# Patient Record
Sex: FEMALE | Race: BLACK OR AFRICAN AMERICAN | Employment: OTHER | URBAN - METROPOLITAN AREA
[De-identification: names, ages, dates, MRNs, and addresses within clinical notes are randomized per-mention and may not be internally consistent; named-entity substitution may affect disease eponyms.]

---

## 2017-09-15 ENCOUNTER — HOSPITAL ENCOUNTER (OUTPATIENT)
Dept: MAMMOGRAPHY | Age: 67
Discharge: HOME | End: 2017-09-15
Attending: OBSTETRICS & GYNECOLOGY
Payer: MEDICARE

## 2017-09-15 DIAGNOSIS — Z12.31 VISIT FOR SCREENING MAMMOGRAM: ICD-10-CM

## 2017-09-15 PROCEDURE — 77067 SCR MAMMO BI INCL CAD: CPT

## 2018-04-25 ENCOUNTER — HOSPITAL ENCOUNTER (OUTPATIENT)
Dept: MAMMOGRAPHY | Age: 68
Discharge: HOME | End: 2018-04-25
Attending: FAMILY MEDICINE
Payer: MEDICARE

## 2018-04-25 DIAGNOSIS — Z78.0 POST-MENOPAUSAL: ICD-10-CM

## 2018-04-25 PROCEDURE — 77080 DXA BONE DENSITY AXIAL: CPT

## 2018-08-17 ENCOUNTER — HOSPITAL ENCOUNTER (OUTPATIENT)
Dept: LAB | Age: 68
Discharge: HOME | End: 2018-08-17

## 2018-08-17 PROCEDURE — 88305 TISSUE EXAM BY PATHOLOGIST: CPT

## 2018-09-17 ENCOUNTER — HOSPITAL ENCOUNTER (OUTPATIENT)
Dept: MAMMOGRAPHY | Age: 68
Discharge: HOME | End: 2018-09-17
Payer: MEDICARE

## 2018-09-17 DIAGNOSIS — Z12.39 SCREENING BREAST EXAMINATION: ICD-10-CM

## 2018-09-17 PROCEDURE — 77067 SCR MAMMO BI INCL CAD: CPT

## 2019-10-04 ENCOUNTER — HOSPITAL ENCOUNTER (OUTPATIENT)
Dept: MAMMOGRAPHY | Age: 69
Discharge: HOME | End: 2019-10-04
Attending: OBSTETRICS & GYNECOLOGY
Payer: MEDICARE

## 2019-10-04 DIAGNOSIS — Z12.31 VISIT FOR SCREENING MAMMOGRAM: ICD-10-CM

## 2019-10-04 PROCEDURE — 77063 BREAST TOMOSYNTHESIS BI: CPT

## 2020-10-06 ENCOUNTER — HOSPITAL ENCOUNTER (OUTPATIENT)
Dept: MAMMOGRAPHY | Age: 70
Discharge: HOME | End: 2020-10-06
Attending: FAMILY MEDICINE
Payer: MEDICARE

## 2020-10-06 DIAGNOSIS — Z12.31 SCREENING MAMMOGRAM, ENCOUNTER FOR: ICD-10-CM

## 2020-10-06 PROCEDURE — 77067 SCR MAMMO BI INCL CAD: CPT

## 2020-10-08 ENCOUNTER — TRANSCRIBE ORDER (OUTPATIENT)
Dept: SCHEDULING | Age: 70
End: 2020-10-08

## 2020-10-08 DIAGNOSIS — M81.0 OSTEOPOROSIS, UNSPECIFIED OSTEOPOROSIS TYPE, UNSPECIFIED PATHOLOGICAL FRACTURE PRESENCE: ICD-10-CM

## 2020-10-08 DIAGNOSIS — E55.9 VITAMIN D DEFICIENCY: Primary | ICD-10-CM

## 2020-10-12 ENCOUNTER — TRANSCRIBE ORDER (OUTPATIENT)
Dept: SCHEDULING | Age: 70
End: 2020-10-12

## 2020-10-12 DIAGNOSIS — N64.4 BREAST PAIN: Primary | ICD-10-CM

## 2020-10-14 ENCOUNTER — HOSPITAL ENCOUNTER (OUTPATIENT)
Dept: MAMMOGRAPHY | Age: 70
Discharge: HOME | End: 2020-10-14
Attending: FAMILY MEDICINE
Payer: MEDICARE

## 2020-10-14 DIAGNOSIS — E55.9 VITAMIN D DEFICIENCY: ICD-10-CM

## 2020-10-14 DIAGNOSIS — M81.0 OSTEOPOROSIS, UNSPECIFIED OSTEOPOROSIS TYPE, UNSPECIFIED PATHOLOGICAL FRACTURE PRESENCE: ICD-10-CM

## 2020-10-14 PROCEDURE — 77080 DXA BONE DENSITY AXIAL: CPT

## 2020-10-21 ENCOUNTER — HOSPITAL ENCOUNTER (OUTPATIENT)
Dept: MAMMOGRAPHY | Age: 70
Discharge: HOME | End: 2020-10-21
Attending: NURSE PRACTITIONER
Payer: MEDICARE

## 2020-10-21 DIAGNOSIS — N64.4 BREAST PAIN: ICD-10-CM

## 2020-10-21 PROCEDURE — 77065 DX MAMMO INCL CAD UNI: CPT

## 2020-10-21 PROCEDURE — 76642 ULTRASOUND BREAST LIMITED: CPT

## 2021-08-11 ENCOUNTER — TRANSCRIBE ORDER (OUTPATIENT)
Dept: SCHEDULING | Age: 71
End: 2021-08-11

## 2021-08-11 DIAGNOSIS — Z12.31 VISIT FOR SCREENING MAMMOGRAM: Primary | ICD-10-CM

## 2021-10-08 ENCOUNTER — HOSPITAL ENCOUNTER (OUTPATIENT)
Dept: MAMMOGRAPHY | Age: 71
Discharge: HOME | End: 2021-10-08
Attending: FAMILY MEDICINE
Payer: MEDICARE

## 2021-10-08 DIAGNOSIS — Z12.31 VISIT FOR SCREENING MAMMOGRAM: ICD-10-CM

## 2021-10-08 PROCEDURE — 77063 BREAST TOMOSYNTHESIS BI: CPT

## 2022-04-05 ENCOUNTER — HOSPITAL ENCOUNTER (OUTPATIENT)
Dept: PHYSICAL THERAPY | Age: 72
Discharge: HOME | End: 2022-04-05
Payer: MEDICARE

## 2022-04-05 PROCEDURE — 97140 MANUAL THERAPY 1/> REGIONS: CPT

## 2022-04-05 PROCEDURE — 97161 PT EVAL LOW COMPLEX 20 MIN: CPT

## 2022-04-05 NOTE — THERAPY EVALUATION
[id number]}  Luz Johns  : 1950  [place]  Secondary: 2830 [facility] at [facility]  [address] [place] [address]  [phone]   [phone]          OUTPATIENT PHYSICAL THERAPY:Initial Assessment 2022   ICD-10: Treatment Diagnosis: Neck pain (M54.2)  Precautions/Allergies:   Asa-acetaminophen-caff-buffers, Darvon [propoxyphene], Myrbetriq [mirabegron], and Sulfa (sulfonamide antibiotics)   TREATMENT PLAN:  Effective Dates: 2022 TO 2022 (60 days). Frequency/Duration: 2 times a week for 60 Day(s) MEDICAL/REFERRING DIAGNOSIS:  Cervicalgia [M54.2]   DATE OF ONSET: 2022  REFERRING PHYSICIAN: Raquel Cruz MD MD Orders: evaluate and treat  Return MD Appointment: May 2022     INITIAL ASSESSMENT:  Ms. Andreea Candelaria presents in therapy today with B/L paracervical muscle pain, which began in 2022. Signs and symptoms indicate a muscle strain, with decreased motion at the cervical spine. She will benefit from skilled PT in order to reach optimum functional mobility and pain at the cervical region. PROBLEM LIST (Impacting functional limitations):  1. Decreased Strength  2. Decreased ADL/Functional Activities  3. Increased Pain  4. Decreased Activity Tolerance  5. Decreased Flexibility/Joint Mobility  6. Decreased Knowledge of Precautions  7. Decreased [place] with Home Exercise Program INTERVENTIONS PLANNED: (Treatment may consist of any combination of the following)  1. Cold  2. Heat  3. Home Exercise Program (HEP)  4. Manual Therapy  5. Neuromuscular Re-education/Strengthening  6. Range of Motion (ROM)  7. Therapeutic Activites  8. Therapeutic Exercise/Strengthening     GOALS: (Goals have been discussed and agreed upon with patient.)    SHORT-TERM FUNCTIONAL GOALS: Time Frame: 3 weeks  1. Patient will be compliant with HEP focused on cervical/postural stabilization and strength/ROM.    2. Patient will rate neck pain no greater than 5/10 at worst for improved tolerance to daily and work activities. DISCHARGE GOALS: Time Frame: 6 weeks  1. Patient will be independent with comprehensive HEP focused on cervical stabilization and postural strengthening. 2.  Patient will rate neck pain no greater than 3/10 and which does not significantly interfere with daily or work duties. 3.  Patient will demonstrate cervical AROM to be [facility] [address] [facility] for improved safety with functional mobility. OUTCOME MEASURE:   Tool Used: Neck Disability Index (NDI)  Score:  Initial: 14/50  Most Recent: X/50 (Date: -- )   Interpretation of Score: The Neck Disability Index is a revised form of the Oswestry Low Back Pain Index and is designed to measure the activities of daily living in person's with neck pain. Each section is scored on a 0-5 scale, 5 representing the greatest disability. The scores of each section are added together for a total score of 50. MEDICAL NECESSITY:   · Patient is expected to demonstrate progress in strength, range of motion and functional technique to improve safety during functional mobility. REASON FOR SERVICES/OTHER COMMENTS:  · Patient continues to require skilled intervention due to not reaching long term goals. Total Duration:  PT Patient Time In/Time Out  Time In: 1300  Time Out: 1355    Rehabilitation Potential For Stated Goals: Good  Regarding Mayi Matthews's therapy, I certify that the treatment plan above will be carried out by a therapist or under their direction. Thank you for this referral,  Radha Prabhakar, PT, DPT     Referring Physician Signature: Daniel Rodas MD _______________________________ Date _____________      PAIN/SUBJECTIVE:   Initial: Pain Intensity 1: 8  Pain Location 1: Neck  Post Session:  5/10   HISTORY:   History of Injury/Illness (Reason for Referral):  Patient reports insidious onset of neck pain, beginning around 2-3 weeks ago.  She reports stiffness at pain at the neck, particularly along paraspinals and upper trap. She rates pain as 8/10 at worst, without dizziness, vision issues, sensory deficits. Past Medical History/Comorbidities:   Ms. Jennette Goltz  has a past medical history of Anemia, IBS (irritable bowel syndrome), Mixed conductive and sensorineural hearing loss, Unspecified hypothyroidism (2/26/2013), and Unspecified vitamin D deficiency. Ms. Jennette Goltz  has a past surgical history that includes hx breast lumpectomy; hx total abdominal hysterectomy (1986); hx hemorrhoidectomy (1968); pr appendectomy; hx other surgical; hx other surgical; hx other surgical; hx cataract removal (Bilateral, 2018); hx breast biopsy (Right, over 30 yrs ago); and hx breast biopsy (Left, over 30 yrs ago). Social History/Living Environment:     Independent   Prior Level of Function/Work/Activity:  Retired nurse   Personal Factors:          Sex:  female        Age:  66 y.o. Ambulatory/Rehab Services H2 Model Falls Risk Assessment   Risk Factors:       No Risk Factors Identified Ability to Rise from Chair:       (0)  Ability to rise in a single movement   Falls Prevention Plan:       No modifications necessary   Total: (5 or greater = High Risk): 0   ©2010 [facility] [employer]. All Rights Reserved. [facility] Patent #[id number]. Federal Law prohibits the replication, distribution or use without written permission from [facility] [employer]   Current Medications:       Current Outpatient Medications:     ferrous gluconate (FERGON) 240 mg (27 mg iron) tablet, Take 240 mg by mouth three (3) times daily (with meals). , Disp: , Rfl:     levothyroxine (SYNTHROID) 50 mcg tablet, TAKE ONE TABLET BY MOUTH EVERY DAY, Disp: 30 Tab, Rfl: 11    cod liver oil cap, Take  by mouth., Disp: , Rfl:     biotin 10 mg tab, Take  by mouth., Disp: , Rfl:     docusate sodium (COLACE) 100 mg capsule, Take 100 mg by mouth., Disp: , Rfl:     CALCIUM POLYCARBOPHIL (FIBERCON PO), Take by mouth., Disp: , Rfl:     DOCUSATE CALCIUM (STOOL SOFTENER PO), Take  by mouth., Disp: , Rfl:     calcium citrate-vitamin D3 (CITRACAL WITH VITAMIN D MAXIMUM) tablet, Take  by mouth two (2) times a day., Disp: , Rfl:     cholecalciferol, vitamin D3, (VITAMIN D3) 2,000 unit Tab, Take  by mouth., Disp: , Rfl:    Date Last Reviewed:  4/6/2022     Number of Personal Factors/Comorbidities that affect the Plan of Care: 0: LOW COMPLEXITY   EXAMINATION:   Observation/Orthostatic Postural Assessment:          Patient appears in healthy condition, very pleasant demeanor; slight forward head with bilaterally rounded shoulders   Palpation:          Tenderness and palpable tightness along cervical paraspinals, upper trap, and at B/L mastoid process  ROM:          Cervical AROM: B/L side-bend 50%, rotation 50%, flexion 75%, extension 25%   Body Structures Involved:  1. Bones  2. Joints  3. Muscles Body Functions Affected:  1. Sensory/Pain  2. Movement Related Activities and Participation Affected:  1. General Tasks and Demands  2. Mobility  3. Interpersonal Interactions and Relationships  4.  Community, Social and [place]   Number of elements (examined above) that affect the Plan of Care: 1-2: LOW COMPLEXITY   CLINICAL PRESENTATION:   Presentation: Stable and uncomplicated: LOW COMPLEXITY   CLINICAL DECISION MAKING:   Use of outcome tool(s) and clinical judgement create a POC that gives a: Clear prediction of patient's progress: LOW COMPLEXITY

## 2022-04-06 NOTE — PROGRESS NOTES
Lilibeth Horner  : 1950  [place]  Secondary: 2830 [facility]  [address] [place] [address]  [phone]   [phone]        OUTPATIENT PHYSICAL THERAPY: Daily Treatment Note 2022  Visit Count:  1    ICD-10: Treatment Diagnosis: Neck pain (M54.2)  Precautions/Allergies:   Asa-acetaminophen-caff-buffers, Darvon [propoxyphene], Myrbetriq [mirabegron], and Sulfa (sulfonamide antibiotics)   TREATMENT PLAN:  Effective Dates: 2022 TO 2022 (60 days). Frequency/Duration: 2 times a week for 60 Day(s) MEDICAL/REFERRING DIAGNOSIS:  Cervicalgia [M54.2]   DATE OF ONSET: 2022  REFERRING PHYSICIAN: Say Ariza MD MD Orders: evaluate and treat  Return MD Appointment: May 2022       Pre-treatment Symptoms/Complaints:  Patient reports she has stiffness and pain at the neck, along the side muscles of her neck. Pain: Initial: Pain Intensity 1: 8  Pain Location 1: Neck  Post Session:  5/10   Medications Last Reviewed:  2022  Updated Objective Findings:  See evaluation note from today  TREATMENT:     MANUAL THERAPY: (15 minutes): Joint mobilization and Soft tissue mobilization was utilized and necessary because of the patient's restricted joint motion and restricted motion of soft tissue. Patient received STM to B/L cervical paraspinals and upper trap, patient seated       Date:   Date:   Date:     Activity/Exercise Parameters Parameters Parameters                                               Treatment/Session Summary:    · Response to Treatment:  Patient reported decreased pain following manual therapy today.  .  · Communication/Consultation:  provided HEP focused on gentle neck AAROM  · Equipment provided today:  None today  · Recommendations/Intent for next treatment session: Next visit will focus on gentle progression of cervical motion, postural strengthening, manual work.    Total Treatment Billable Duration:  45 minutes - 30 minutes initial evaluation, 15 minutes manual therapy   PT Patient Time In/Time Out  Time In: 1300  Time Out: 280 Rafael Hobbs PT, DPT    Future Appointments   Date Time Provider Jeannette Russ   4/8/2022 10:00 AM Huyen Perez PTA [facility] [place]   4/12/2022  1:00 PM Huyen Perez PTA [facility] [place]   4/15/2022  1:00 PM Huyen Perez PTA [facility] [place]   4/19/2022  1:00 PM Rozina Terry PT, DPT [facility] [place]   4/21/2022  2:00 PM Rozina Terry PT, DPT [facility] [place]   7/20/2022 10:15 AM Juan Antonio Mcadams MD [address] [place]

## 2022-04-08 ENCOUNTER — HOSPITAL ENCOUNTER (OUTPATIENT)
Dept: PHYSICAL THERAPY | Age: 72
Discharge: HOME | End: 2022-04-08
Payer: MEDICARE

## 2022-04-08 PROCEDURE — 97140 MANUAL THERAPY 1/> REGIONS: CPT

## 2022-04-08 PROCEDURE — 97110 THERAPEUTIC EXERCISES: CPT

## 2022-04-08 NOTE — PROGRESS NOTES
Ernesto Lyons  : 1950  [place]  Secondary: 2830 [facility] at [address]  [phone]   [phone]        OUTPATIENT PHYSICAL THERAPY: Daily Treatment Note 2022  Visit Count:  2    ICD-10: Treatment Diagnosis: Neck pain (M54.2)  Precautions/Allergies:   Asa-acetaminophen-caff-buffers, Darvon [propoxyphene], Myrbetriq [mirabegron], and Sulfa (sulfonamide antibiotics)   TREATMENT PLAN:  Effective Dates: 2022 TO 2022 (60 days). Frequency/Duration: 2 times a week for 60 Day(s) MEDICAL/REFERRING DIAGNOSIS:  Cervicalgia [M54.2]   DATE OF ONSET: 2022  REFERRING PHYSICIAN: Radha Perdomo MD MD Orders: evaluate and treat  Return MD Appointment: May 2022       Pre-treatment Symptoms/Complaints:  Patient reports she has been doing exercise at home, and it's been feeling a bit better. Pain: Initial: Pain Intensity 1: 7  Pain Location 1: Neck  Post Session:  5/10   Medications Last Reviewed:  2022  Updated Objective Findings:  None today  TREATMENT:       THERAPEUTIC EXERCISE: (45 minutes):  Exercises per grid below to improve mobility and strength. Required moderate visual, verbal and manual cues to promote proper body alignment, promote proper body posture, promote proper body mechanics and promote proper body breathing techniques. Progressed resistance, range, repetitions and complexity of movement as indicated.      Date:  22 Date:   Date:     Activity/Exercise Parameters Parameters Parameters   UBE Level 1, 5 minutes     Cervical AROM (6 planes) x20 each, gentle     Scapular retractions Hold 3 x 20     Posterior shoulder rolls x10 hold 3      Seated cervical retraction  x20 hold 3     Upper trap stretch with hand anchored Hold 30 x3 B     Levator scap stretch with hand anchored Hold 30 x 3 B       MANUAL THERAPY: (15 minutes): Joint mobilization and Soft tissue mobilization was utilized and necessary because of the patient's restricted joint motion and restricted motion of soft tissue. Patient received STM to B/L cervical paraspinals and upper trap, patient seated, with biofreeze      Treatment/Session Summary:    · Response to Treatment:  Patient did well with addition of postural exercises and new stretches. She reported feeling less tension and pain after her session today, and noted improved postural awareness after her session. · Communication/Consultation:  Updated written HEP  · Equipment provided today:  None today  · Recommendations/Intent for next treatment session: Next visit will focus on gentle progression of cervical motion, postural strengthening, manual work.     Total Treatment Billable Duration:  60 minutes - 45 minutes therapeutic exercise, 15 minutes manual therapy   PT Patient Time In/Time Out  Time In: 0907  Time Out: 15[address] [facility]    Future Appointments   Date Time Provider Jeannette Russ   4/12/2022  1:00 PM Eunice Wilhelm PTA [facility]   4/15/2022  1:00 PM Eunice Wilhelm PTA [facility]   4/19/2022  1:00 PM Jerica Calhoun, PT, DPT [facility]   4/21/2022  2:00 PM Jerica Calhoun, PT, DPT [facility]   7/20/2022 10:15 AM Jeanine Hinds MD [address] [facility] [address] [place]

## 2022-04-12 ENCOUNTER — HOSPITAL ENCOUNTER (OUTPATIENT)
Dept: PHYSICAL THERAPY | Age: 72
Discharge: HOME | End: 2022-04-12
Payer: MEDICARE

## 2022-04-12 PROCEDURE — 97110 THERAPEUTIC EXERCISES: CPT

## 2022-04-12 PROCEDURE — 97140 MANUAL THERAPY 1/> REGIONS: CPT

## 2022-04-12 NOTE — PROGRESS NOTES
Celina Peter  : 1950  [place]  Secondary: 2830 [facility]  [address] [place] [address]  [phone]   [phone]        OUTPATIENT PHYSICAL THERAPY: Daily Treatment Note 2022  Visit Count:  3    ICD-10: Treatment Diagnosis: Neck pain (M54.2)  Precautions/Allergies:   Asa-acetaminophen-caff-buffers, Darvon [propoxyphene], Myrbetriq [mirabegron], and Sulfa (sulfonamide antibiotics)   TREATMENT PLAN:  Effective Dates: 2022 TO 2022 (60 days). Frequency/Duration: 2 times a week for 60 Day(s) MEDICAL/REFERRING DIAGNOSIS:  Cervicalgia [M54.2]   DATE OF ONSET: 2022  REFERRING PHYSICIAN: Blaine Enamorado MD MD Orders: evaluate and treat  Return MD Appointment: May 2022       Pre-treatment Symptoms/Complaints:  Patient reports she did a lot of house cleaning on Saturday and then felt significant pain on , she had to take Ibuprofen and use ice to calm it down. She felt good after leaving her last therapy session thought and feels good about her HEP. Pain: Initial:    Post Session:  5/10   Medications Last Reviewed:  2022  Updated Objective Findings:  None today  TREATMENT:       THERAPEUTIC EXERCISE: (25 minutes):  Exercises per grid below to improve mobility and strength. Required moderate visual, verbal and manual cues to promote proper body alignment, promote proper body posture, promote proper body mechanics and promote proper body breathing techniques. Progressed resistance, range, repetitions and complexity of movement as indicated.      Date:  22 Date:  22 Date:     Activity/Exercise Parameters Parameters Parameters   UBE Level 1, 5 minutes Level 1   4 minutes      Cervical AROM (6 planes) x20 each, gentle     Scapular retractions Hold 3 x 20     Posterior shoulder rolls x10 hold 3      Seated cervical retraction  x20 hold 3     Upper trap stretch with hand anchored Hold 30 x3 B     Levator scap stretch with hand anchored Hold 30 x 3 B     hooklying cervical retraction into towel roll  Hold 3 x 10    Standing rows  Red x10    Corner chest stretch  Hold 30 x 3    Doorway chest stretch  Hold 30 x3      MANUAL THERAPY: (35 minutes): Joint mobilization and Soft tissue mobilization was utilized and necessary because of the patient's restricted joint motion and restricted motion of soft tissue. Patient received STM to B/L cervical paraspinals and upper trap, patient seated and hooklying, with biofreeze        Treatment/Session Summary:    · Response to Treatment:  Patient did well with addition of hooklying cervical STM and new postural exercises and chest wall stretches. She reported feeling less tension and pain after her session today, and noted improved postural awareness after her session. · Communication/Consultation:  Updated written HEP and issued red theraband  · Equipment provided today:  None today  · Recommendations/Intent for next treatment session: Next visit will focus on gentle progression of cervical motion, postural strengthening, manual work.     Total Treatment Billable Duration:  60 minutes - 25 minutes therapeutic exercise, 35 minutes manual therapy   PT Patient Time In/Time Out  Time In: 1255  Time Out: [address] [facility] [place]    Future Appointments   Date Time Provider Jeannette Russ   4/15/2022  1:00 PM Huyen Perez PTA [facility]   4/19/2022  1:00 PM Rozina Terry PT, DPT [facility]   4/21/2022  2:00 PM Rozina Terry PT, DPT [facility]   7/20/2022 10:15 AM Juan Antonio Mcadams MD [address] [facility] [address] [place]

## 2022-04-14 ENCOUNTER — HOSPITAL ENCOUNTER (OUTPATIENT)
Dept: PHYSICAL THERAPY | Age: 72
Discharge: HOME | End: 2022-04-14
Payer: MEDICARE

## 2022-04-14 PROCEDURE — 97140 MANUAL THERAPY 1/> REGIONS: CPT

## 2022-04-14 PROCEDURE — 97110 THERAPEUTIC EXERCISES: CPT

## 2022-04-14 NOTE — PROGRESS NOTES
Geovany Gallego  : 1950  [place]  Secondary: 2830 [facility] at [address]  [phone]   [phone]        OUTPATIENT PHYSICAL THERAPY: Daily Treatment Note 2022  Visit Count:  4    ICD-10: Treatment Diagnosis: Neck pain (M54.2)  Precautions/Allergies:   Asa-acetaminophen-caff-buffers, Darvon [propoxyphene], Myrbetriq [mirabegron], and Sulfa (sulfonamide antibiotics)   TREATMENT PLAN:  Effective Dates: 2022 TO 2022 (60 days). Frequency/Duration: 2 times a week for 60 Day(s) MEDICAL/REFERRING DIAGNOSIS:  Cervicalgia [M54.2]   DATE OF ONSET: 2022  REFERRING PHYSICIAN: Jeane Lange MD MD Orders: evaluate and treat  Return MD Appointment: May 2022       Pre-treatment Symptoms/Complaints:  Patient reports she feels like she is steadily improving with her pain and motion. Still feels her neck the greatest when she bends to the side. Pain: Initial: Pain Intensity 1: 4  Pain Location 1: Neck  Post Session:  -3/10   Medications Last Reviewed:  2022  Updated Objective Findings:  None today  TREATMENT:       THERAPEUTIC EXERCISE: (15 minutes):  Exercises per grid below to improve mobility and strength. Required moderate visual, verbal and manual cues to promote proper body alignment, promote proper body posture, promote proper body mechanics and promote proper body breathing techniques. Progressed resistance, range, repetitions and complexity of movement as indicated.      Date:  22 Date:  22 Date:     Activity/Exercise Parameters Parameters Parameters   UBE Level 1, 5 minutes Level 1   4 minutes      Cervical AROM (6 planes) x20 each, gentle     Scapular retractions Hold 3 x 20     Posterior shoulder rolls x10 hold 3      Seated cervical retraction  x20 hold 3     Upper trap stretch with hand anchored Hold 30 x3 B     Levator scap stretch with hand anchored Hold 30 x 3 B     hooklying cervical retraction into towel roll  Hold 3 x 10    Standing rows  Red x10    Corner chest stretch  Hold 30 x 3    Doorway chest stretch  Hold 30 x3      MANUAL THERAPY: (30 minutes): Joint mobilization and Soft tissue mobilization was utilized and necessary because of the patient's restricted joint motion and restricted motion of soft tissue. Patient received STM to B/L cervical paraspinals and upper trap, patient seated and hooklying, with biofreeze        Treatment/Session Summary:    · Response to Treatment:  Patient did well with continued focus on postural improvement and paracervical/scapular STM. · Communication/Consultation:  Updated written HEP and issued red theraband  · Equipment provided today:  None today  · Recommendations/Intent for next treatment session: Next visit will focus on gentle progression of cervical motion, postural strengthening, manual work.     Total Treatment Billable Duration:  45 minutes - 15 minutes therapeutic exercise, 30 minutes manual therapy   PT Patient Time In/Time Out  Time In: 1400  Time Out: [address] [facility], PT, DPT    Future Appointments   Date Time Provider Jeannette Russ   4/19/2022  1:00 PM Kenny Borjas, PT, DPT [facility]   4/21/2022  2:00 PM Kenny Borjas PT, DPT [facility]   7/20/2022 10:15 AM Priti Davison MD [address] [place]

## 2022-04-15 ENCOUNTER — APPOINTMENT (OUTPATIENT)
Dept: PHYSICAL THERAPY | Age: 72
End: 2022-04-15
Payer: MEDICARE

## 2022-04-19 ENCOUNTER — HOSPITAL ENCOUNTER (OUTPATIENT)
Dept: PHYSICAL THERAPY | Age: 72
Discharge: HOME | End: 2022-04-19
Payer: MEDICARE

## 2022-04-19 PROCEDURE — 97140 MANUAL THERAPY 1/> REGIONS: CPT

## 2022-04-19 PROCEDURE — 97110 THERAPEUTIC EXERCISES: CPT

## 2022-04-19 NOTE — PROGRESS NOTES
Idalia Huff  : 1950  [place]  Secondary: 2830 [facility] at [facility]  [address]  [phone]   [phone]        OUTPATIENT PHYSICAL THERAPY: Daily Treatment Note 2022  Visit Count:  5    ICD-10: Treatment Diagnosis: Neck pain (M54.2)  Precautions/Allergies:   Asa-acetaminophen-caff-buffers, Darvon [propoxyphene], Myrbetriq [mirabegron], and Sulfa (sulfonamide antibiotics)   TREATMENT PLAN:  Effective Dates: 2022 TO 2022 (60 days). Frequency/Duration: 2 times a week for 60 Day(s) MEDICAL/REFERRING DIAGNOSIS:  Cervicalgia [M54.2]   DATE OF ONSET: 2022  REFERRING PHYSICIAN: Pop Kwon MD MD Orders: evaluate and treat  Return MD Appointment: May 2022       Pre-treatment Symptoms/Complaints:  Patient reports \"I'm definitely better. \"     Pain: Initial: Pain Intensity 1: 3  Pain Location 1: Neck  Post Session:  2-3/10   Medications Last Reviewed:  2022  Updated Objective Findings:  None today  TREATMENT:       THERAPEUTIC EXERCISE: (15 minutes):  Exercises per grid below to improve mobility and strength. Required moderate visual, verbal and manual cues to promote proper body alignment, promote proper body posture, promote proper body mechanics and promote proper body breathing techniques. Progressed resistance, range, repetitions and complexity of movement as indicated.      Date:  22 Date:  22 Date:  22   Activity/Exercise Parameters Parameters Parameters   UBE Level 1, 5 minutes Level 1   4 minutes   Level 1   4 minutes   Cervical AROM (6 planes) x20 each, gentle     Scapular retractions Hold 3 x 20     Posterior shoulder rolls x10 hold 3      Seated cervical retraction  x20 hold 3     Upper trap stretch with hand anchored Hold 30 x3 B     Levator scap stretch with hand anchored Hold 30 x 3 B     hooklying cervical retraction into towel roll Hold 3 x 10    Shoulder extension     Blue band  3x10    Standing rows  Red x10 Blue band  3x10   Corner chest stretch  Hold 30 x 3    Doorway chest stretch  Hold 30 x3      MANUAL THERAPY: (30 minutes): Joint mobilization and Soft tissue mobilization was utilized and necessary because of the patient's restricted joint motion and restricted motion of soft tissue. Patient received STM to B/L cervical paraspinals and upper trap, patient seated and hooklying, with biofreeze        Treatment/Session Summary:    · Response to Treatment:  Patient is reporting steady improvements in her pain and stiffness. · Communication/Consultation:  Updated written HEP and issued red theraband  · Equipment provided today:  None today  · Recommendations/Intent for next treatment session: Next visit will focus on gentle progression of cervical motion, postural strengthening, manual work.     Total Treatment Billable Duration:  45 minutes - 15 minutes therapeutic exercise, 30 minutes manual therapy   PT Patient Time In/Time Out  Time In: 1300  Time Out: [address] [facility], PT, DPT    Future Appointments   Date Time Provider Jeannette Russ   4/21/2022  2:00 PM Ángela Hickman [place], DPT [facility]   7/20/2022 10:15 AM Tyron Paige MD [address] [facility] [address] [place]

## 2022-04-21 ENCOUNTER — HOSPITAL ENCOUNTER (OUTPATIENT)
Dept: PHYSICAL THERAPY | Age: 72
Discharge: HOME | End: 2022-04-21
Payer: MEDICARE

## 2022-04-21 PROCEDURE — 97110 THERAPEUTIC EXERCISES: CPT

## 2022-04-21 PROCEDURE — 97140 MANUAL THERAPY 1/> REGIONS: CPT

## 2022-04-21 NOTE — PROGRESS NOTES
Evi Chivo  : 1950  [place]  Secondary: [facility]  [address] [facility], [facility], [address] [place] [address]  [phone]   [phone]        OUTPATIENT PHYSICAL THERAPY: Daily Treatment Note 2022  Visit Count:  6    ICD-10: Treatment Diagnosis: Neck pain (M54.2)  Precautions/Allergies:   Asa-acetaminophen-caff-buffers, Darvon [propoxyphene], Myrbetriq [mirabegron], and Sulfa (sulfonamide antibiotics)   TREATMENT PLAN:  Effective Dates: 2022 TO 2022 (60 days). Frequency/Duration: 2 times a week for 60 Day(s) MEDICAL/REFERRING DIAGNOSIS:  Cervicalgia [M54.2]   DATE OF ONSET: 2022  REFERRING PHYSICIAN: Reji Farrar MD MD Orders: evaluate and treat  Return MD Appointment: May 2022       Pre-treatment Symptoms/Complaints:  Patient reports \"I'm definitely better. \"     Pain: Initial: Pain Intensity 1: 2  Pain Location 1: Neck  Post Session:  2-3/10   Medications Last Reviewed:  2022  Updated Objective Findings:  None today  TREATMENT:       THERAPEUTIC EXERCISE: (15 minutes):  Exercises per grid below to improve mobility and strength. Required moderate visual, verbal and manual cues to promote proper body alignment, promote proper body posture, promote proper body mechanics and promote proper body breathing techniques. Progressed resistance, range, repetitions and complexity of movement as indicated.      Date:  22 Date:  22 Date:  22 Date  22   Activity/Exercise Parameters Parameters Parameters    UBE Level 1, 5 minutes Level 1   4 minutes   Level 1   4 minutes 5 minutes  Level 1   Cervical AROM (6 planes) x20 each, gentle      Scapular retractions Hold 3 x 20      Posterior shoulder rolls x10 hold 3       Seated cervical retraction  x20 hold 3      Upper trap stretch with hand anchored Hold 30 x3 B      Levator scap stretch with hand anchored Hold 30 x 3 B      Rows 25 lb  2x10   Pec fly machine      25 lb  2x10   hooklying cervical retraction into towel roll  Hold 3 x 10     Shoulder extension     Blue band  3x10     Standing rows  Red x10 Blue band  3x10    Corner chest stretch  Hold 30 x 3     Doorway chest stretch  Hold 30 x3       MANUAL THERAPY: (30 minutes): Joint mobilization and Soft tissue mobilization was utilized and necessary because of the patient's restricted joint motion and restricted motion of soft tissue. Patient received STM to B/L cervical paraspinals and upper trap, patient seated and hooklying, with biofreeze        Treatment/Session Summary:    · Response to Treatment:  Patient is reporting steady improvements in her pain and stiffness. She enjoyed introduction of machine resistance work today. · Communication/Consultation:  Updated written HEP and issued red theraband  · Equipment provided today:  None today  · Recommendations/Intent for next treatment session: Next visit will focus on gentle progression of cervical motion, postural strengthening, manual work.     Total Treatment Billable Duration:  45 minutes - 15 minutes therapeutic exercise, 30 minutes manual therapy   PT Patient Time In/Time Out  Time In: 1400  Time Out: Via Neha Cardenas, PT, DPT    Future Appointments   Date Time Provider Jeannette Russ   4/25/2022  1:00 PM Serenity Manning PTA [facility]   4/27/2022  2:00 PM Alex Brennan, PT, DPT [facility]   5/2/2022  1:00 PM Serenity Manning PTA [facility]   5/4/2022  1:00 PM Serenity Manning PTA [facility]   5/9/2022  1:00 PM Serenity Manning PTA [facility]   5/12/2022  1:00 PM Alex Brennan, PT, DPT [facility]   7/20/2022 10:15 AM Demond Yap MD [address] [place]

## 2022-04-25 ENCOUNTER — HOSPITAL ENCOUNTER (OUTPATIENT)
Dept: PHYSICAL THERAPY | Age: 72
Discharge: HOME | End: 2022-04-25
Payer: MEDICARE

## 2022-04-25 PROCEDURE — 97110 THERAPEUTIC EXERCISES: CPT

## 2022-04-25 PROCEDURE — 97140 MANUAL THERAPY 1/> REGIONS: CPT

## 2022-04-25 NOTE — PROGRESS NOTES
Luz Johns  : 1950  [place]  Secondary: [address] [facility] at [address]  [phone]   [phone]        OUTPATIENT PHYSICAL THERAPY: Daily Treatment Note 2022  Visit Count:  7    ICD-10: Treatment Diagnosis: Neck pain (M54.2)  Precautions/Allergies:   Asa-acetaminophen-caff-buffers, Darvon [propoxyphene], Myrbetriq [mirabegron], and Sulfa (sulfonamide antibiotics)   TREATMENT PLAN:  Effective Dates: 2022 TO 2022 (60 days). Frequency/Duration: 2 times a week for 60 Day(s) MEDICAL/REFERRING DIAGNOSIS:  Cervicalgia [M54.2]   DATE OF ONSET: 2022  REFERRING PHYSICIAN: Raquel Cruz MD MD Orders: evaluate and treat  Return MD Appointment: May 2022       Pre-treatment Symptoms/Complaints:  Patient reports she went to put on a dress for [employer] yesterday and lifting it overhead to put it on and take it off caused her some pain and tension in her neck. She reports she is feeling a bit better today but stretches aren't really touching it today. Pain: Initial: Pain Intensity 1: 7  Pain Location 1: Neck  Post Session:  2-3/10   Medications Last Reviewed:  2022  Updated Objective Findings:  None today  TREATMENT:       THERAPEUTIC EXERCISE: (30 minutes):  Exercises per grid below to improve mobility and strength. Required moderate visual, verbal and manual cues to promote proper body alignment, promote proper body posture, promote proper body mechanics and promote proper body breathing techniques. Progressed resistance, range, repetitions and complexity of movement as indicated.      Date:  22 Date:  22 Date:  22 Date  22 Date:  22   Activity/Exercise Parameters Parameters Parameters     UBE Level 1, 5 minutes Level 1   4 minutes   Level 1   4 minutes 5 minutes  Level 1 6 minutes  Level 1   Cervical AROM (6 planes) x20 each, gentle       Scapular retractions Hold 3 x 20       Posterior shoulder rolls x10 hold 3     x10   Seated cervical retraction  x20 hold 3    x10   Upper trap stretch with hand anchored Hold 30 x3 B    [id number] each   Levator scap stretch with hand anchored Hold 30 x 3 B    [id number] each   Rows      25 lb  2x10    Pec fly machine      25 lb  2x10    hooklying cervical retraction into towel roll  Hold 3 x 10      Shoulder extension     Blue band  3x10   Red x 20   Standing rows  Red x10 Blue band  3x10  Red x 20   Corner chest stretch  Hold 30 x 3      Doorway chest stretch  Hold 30 x3      Upper thoracic stretch      Hold 30 x 3     MANUAL THERAPY: (30 minutes): Joint mobilization and Soft tissue mobilization was utilized and necessary because of the patient's restricted joint motion and restricted motion of soft tissue. Patient received STM to B/L cervical paraspinals and upper trap, patient seated and hooklying, with biofreeze        Treatment/Session Summary:    · Response to Treatment:  Patient reports significant improvement in pain and tension after session today. · Communication/Consultation:  Updated written HEP and issued red theraband. Demonstrated theracane and discussed what to look out for when purchasing a new pillow. Discussed postural awareness when doing overhead reaching motions. · Equipment provided today:  None today  · Recommendations/Intent for next treatment session: Next visit will focus on gentle progression of cervical motion, postural strengthening, manual work.     Total Treatment Billable Duration:  60 minutes - 30 minutes therapeutic exercise, 30 minutes manual therapy   PT Patient Time In/Time Out  Time In: 1300  Time Out: Shyam Ac PTA    Future Appointments   Date Time Provider Jeannette Russ   4/27/2022  2:00 PM Nabil Singer, PT, DPT [facility]   5/2/2022  1:00 PM Tye Gordon PTA [facility]   5/4/2022  1:00 PM Tye Gordon PTA [facility] 5/9/2022  1:00 PM Marni Phan PTA [facility]   5/12/2022  1:00 PM Cristina Horne PT, DPT [facility]   7/20/2022 10:15 AM Ehsan Soriano MD [address] [place]

## 2022-04-27 ENCOUNTER — HOSPITAL ENCOUNTER (OUTPATIENT)
Dept: PHYSICAL THERAPY | Age: 72
Discharge: HOME | End: 2022-04-27
Payer: MEDICARE

## 2022-04-27 PROCEDURE — 97140 MANUAL THERAPY 1/> REGIONS: CPT

## 2022-04-27 PROCEDURE — 97110 THERAPEUTIC EXERCISES: CPT

## 2022-04-28 NOTE — PROGRESS NOTES
Aurelia Cross  : 1950  [place]  Secondary: [address] [facility] at [address]  [phone]   [phone]        OUTPATIENT PHYSICAL THERAPY: Daily Treatment Note 2022  Visit Count:  8    ICD-10: Treatment Diagnosis: Neck pain (M54.2)  Precautions/Allergies:   Asa-acetaminophen-caff-buffers, Darvon [propoxyphene], Myrbetriq [mirabegron], and Sulfa (sulfonamide antibiotics)   TREATMENT PLAN:  Effective Dates: 2022 TO 2022 (60 days). Frequency/Duration: 2 times a week for 60 Day(s) MEDICAL/REFERRING DIAGNOSIS:  Cervicalgia [M54.2]   DATE OF ONSET: 2022  REFERRING PHYSICIAN: Maura Springer MD MD Orders: evaluate and treat  Return MD Appointment: May 2022       Pre-treatment Symptoms/Complaints:  Patient reports she feels \"much better\" than last session. Pain: Initial: Pain Intensity 1: 3  Pain Location 1: Neck  Post Session:  2-3/10   Medications Last Reviewed:  2022  Updated Objective Findings:  None today  TREATMENT:       THERAPEUTIC EXERCISE: (30 minutes):  Exercises per grid below to improve mobility and strength. Required moderate visual, verbal and manual cues to promote proper body alignment, promote proper body posture, promote proper body mechanics and promote proper body breathing techniques. Progressed resistance, range, repetitions and complexity of movement as indicated.      Date:  22 Date:  22 Date:  22 Date  22 Date:  22 Date  22   Activity/Exercise Parameters Parameters Parameters      UBE Level 1, 5 minutes Level 1   4 minutes   Level 1   4 minutes 5 minutes  Level 1 6 minutes  Level 1 6 minutes  Level 1   Cervical AROM (6 planes) x20 each, gentle        Scapular retractions Hold 3 x 20        Posterior shoulder rolls x10 hold 3     x10    Seated cervical retraction  x20 hold 3    x10    Upper trap stretch with hand anchored Hold 30 x3 B    [id number] each    Levator scap stretch with hand anchored Hold 30 x 3 B    [id number] each    Rows      25 lb  2x10  25 lb  2x10   Pec fly machine      25 lb  2x10  25 lb  2x10   hooklying cervical retraction into towel roll  Hold 3 x 10       Shoulder extension     Blue band  3x10   Red x 20    Standing rows  Red x10 Blue band  3x10  Red x 20    Corner chest stretch  Hold 30 x 3       Doorway chest stretch  Hold 30 x3    3x30 sec each   Upper thoracic stretch      Hold 30 x 3      MANUAL THERAPY: (15 minutes): Joint mobilization and Soft tissue mobilization was utilized and necessary because of the patient's restricted joint motion and restricted motion of soft tissue. Patient received STM to B/L cervical paraspinals and upper trap, patient seated and hooklying, with biofreeze        Treatment/Session Summary:    · Response to Treatment:  Patient reports significant improvement in pain and tension after session today. · Communication/Consultation:  Updated written HEP and issued red theraband. Demonstrated theracane and discussed what to look out for when purchasing a new pillow. Discussed postural awareness when doing overhead reaching motions. · Equipment provided today:  None today  · Recommendations/Intent for next treatment session: Next visit will focus on gentle progression of cervical motion, postural strengthening, manual work.     Total Treatment Billable Duration:  45 minutes - 30 minutes therapeutic exercise, 15 minutes manual therapy   PT Patient Time In/Time Out  Time In: 1400  Time Out: Via Neha Cardenas PT, DPT    Future Appointments   Date Time Provider Jeannette Russ   5/2/2022  1:00 PM Osvaldo Bernstein PTA [facility]   5/4/2022 11:00 AM Osvaldo Bernstein PTA [facility]   5/9/2022  1:00 PM Osvaldo Bernstein PTA [facility]   5/12/2022  1:00 PM Pop Mcmanus PT, DPT [facility]   7/20/2022 10:15 AM Serjio Bassett MD [facility]

## 2022-05-02 ENCOUNTER — HOSPITAL ENCOUNTER (OUTPATIENT)
Dept: PHYSICAL THERAPY | Age: 72
Setting detail: RECURRING SERIES
Discharge: HOME | End: 2022-05-05
Payer: MEDICARE

## 2022-05-02 ENCOUNTER — HOSPITAL ENCOUNTER (OUTPATIENT)
Dept: PHYSICAL THERAPY | Age: 72
Discharge: HOME | End: 2022-05-02
Payer: MEDICARE

## 2022-05-02 PROCEDURE — 97110 THERAPEUTIC EXERCISES: CPT

## 2022-05-02 PROCEDURE — 97140 MANUAL THERAPY 1/> REGIONS: CPT

## 2022-05-02 PROCEDURE — 9990 CHARGE CONVERSION

## 2022-05-02 NOTE — PROGRESS NOTES
Adri Kaufman  : 1950  [place]  Secondary: [facility] at [address]  [phone]   [phone]        OUTPATIENT PHYSICAL THERAPY: Daily Treatment Note 2022  Visit Count:  9    ICD-10: Treatment Diagnosis: Neck pain (M54.2)  Precautions/Allergies:   Asa-acetaminophen-caff-buffers, Darvon [propoxyphene], Myrbetriq [mirabegron], and Sulfa (sulfonamide antibiotics)   TREATMENT PLAN:  Effective Dates: 2022 TO 2022 (60 days). Frequency/Duration: 2 times a week for 60 Day(s) MEDICAL/REFERRING DIAGNOSIS:  Cervicalgia [M54.2]   DATE OF ONSET: 2022  REFERRING PHYSICIAN: Juan R Giraldo MD MD Orders: evaluate and treat  Return MD Appointment: May 23, 2022       Pre-treatment Symptoms/Complaints:  Patient reports she feels she is doing much better and is much more aware of her movement and posture, which has helped significantly. Pain: Initial: Pain Intensity 1: 2  Pain Location 1: Neck  Post Session:  2/10   Medications Last Reviewed:  2022  Updated Objective Findings:  None today  TREATMENT:       THERAPEUTIC EXERCISE: (45 minutes):  Exercises per grid below to improve mobility and strength. Required moderate visual, verbal and manual cues to promote proper body alignment, promote proper body posture, promote proper body mechanics and promote proper body breathing techniques. Progressed resistance, range, repetitions and complexity of movement as indicated.      Date:  22 Date:  22 Date:  22 Date  22 Date:  22 Date  22 Date:  22   Activity/Exercise Parameters Parameters Parameters       UBE Level 1, 5 minutes Level 1   4 minutes   Level 1   4 minutes 5 minutes  Level 1 6 minutes  Level 1 6 minutes  Level 1 8 minutes  Level 1   Cervical AROM (6 planes) x20 each, gentle         Scapular retractions Hold 3 x 20 Posterior shoulder rolls x10 hold 3     x10     Seated cervical retraction  x20 hold 3    x10     Upper trap stretch with hand anchored Hold 30 x3 B    [id number] each     Levator scap stretch with hand anchored Hold 30 x 3 B    [id number] each     Rows      25 lb  2x10  25 lb  2x10    Pec fly machine      25 lb  2x10  25 lb  2x10    hooklying cervical retraction into towel roll  Hold 3 x 10        Shoulder extension     Blue band  3x10   Red x 20  Green x 20   Hold 3   Standing rows  Red x10 Blue band  3x10  Red x 20  Green x 20 hold 5 each   Corner chest stretch  Hold 30 x 3        Doorway chest stretch  Hold 30 x3    3x30 sec each 3x30 seconds   Upper thoracic stretch      Hold 30 x 3     Gentle cervical isometric (side B, flex, ext)       Hold 3 x 10   Bent over row       2# x 20 each   Single UE OH press with postural emphasis       x10 each     MANUAL THERAPY: (15 minutes): Joint mobilization and Soft tissue mobilization was utilized and necessary because of the patient's restricted joint motion and restricted motion of soft tissue. Patient received STM to B/L cervical paraspinals and upper trap, patient seated and hooklying, with freeup massage cream        Treatment/Session Summary:    · Response to Treatment:  Patient demonstrated improved postural awareness and control today with all exercises, and did well with new exercises with minimal cueing. · Communication/Consultation:  Updated written HEP and issued red theraband. Demonstrated theracane and discussed what to look out for when purchasing a new pillow. Discussed postural awareness when doing overhead reaching motions. · Equipment provided today:  None today  · Recommendations/Intent for next treatment session: Next visit will focus on gentle progression of cervical motion, postural strengthening, manual work.     Total Treatment Billable Duration:  60 minutes - 45 minutes therapeutic exercise, 15 minutes manual therapy   PT Patient Time In/Time Out  Time In: 1300  Time Out: Shyam Ac, PTA    Future Appointments   Date Time Provider Jeannette Petrona   5/4/2022 11:00 AM Fara Dove, PT, DPT [facility]   5/9/2022  1:00 PM Anila Figueroa PTA [facility]   5/12/2022  1:00 PM Fara Dove PT, DPT [facility]   7/20/2022 10:15 AM Georgette Esquivel MD [address] [place]

## 2022-05-04 ENCOUNTER — HOSPITAL ENCOUNTER (OUTPATIENT)
Dept: PHYSICAL THERAPY | Age: 72
Discharge: HOME | End: 2022-05-04
Payer: MEDICARE

## 2022-05-04 PROCEDURE — 97140 MANUAL THERAPY 1/> REGIONS: CPT

## 2022-05-04 PROCEDURE — 97110 THERAPEUTIC EXERCISES: CPT

## 2022-05-04 PROCEDURE — 9990 CHARGE CONVERSION

## 2022-05-04 NOTE — PROGRESS NOTES
[id number]}  Ernesto Lyons  : 1950  [place]  Secondary: [address]  [address] [place] [address]  [phone]   [phone]          OUTPATIENT PHYSICAL THERAPY:Progress Report 2022   ICD-10: Treatment Diagnosis: Neck pain (M54.2)  Precautions/Allergies:   Asa-acetaminophen-caff-buffers, Darvon [propoxyphene], Myrbetriq [mirabegron], and Sulfa (sulfonamide antibiotics)   TREATMENT PLAN:  Effective Dates: 2022 TO 2022 (60 days). Frequency/Duration: 2 times a week for 60 Day(s) MEDICAL/REFERRING DIAGNOSIS:  Cervicalgia [M54.2]   DATE OF ONSET: 2022  REFERRING PHYSICIAN: Radha Perdomo MD MD Orders: evaluate and treat  Return MD Appointment: May 2022     ASSESSMENT:  Ms. Filomena Nuñez has attended 10 PT sessions, and is responding very well to therapy. She is reporting decreased pain and improved functional mobility. PROBLEM LIST (Impacting functional limitations):  1. Decreased Strength  2. Decreased ADL/Functional Activities  3. Increased Pain  4. Decreased Activity Tolerance  5. Decreased Flexibility/Joint Mobility  6. Decreased Knowledge of Precautions  7. Decreased Dent with Home Exercise Program INTERVENTIONS PLANNED: (Treatment may consist of any combination of the following)  1. Cold  2. Heat  3. Home Exercise Program (HEP)  4. Manual Therapy  5. Neuromuscular Re-education/Strengthening  6. Range of Motion (ROM)  7. Therapeutic Activites  8. Therapeutic Exercise/Strengthening     GOALS: (Goals have been discussed and agreed upon with patient.)    SHORT-TERM FUNCTIONAL GOALS: Time Frame: 3 weeks  1. Patient will be compliant with HEP focused on cervical/postural stabilization and strength/ROM. GOAL MET 2022   2. Patient will rate neck pain no greater than 5/10 at worst for improved tolerance to daily and work activities.     DISCHARGE GOALS: Time Frame: 6 weeks  1. Patient will be independent with comprehensive HEP focused on cervical stabilization and postural strengthening. 2.  Patient will rate neck pain no greater than 3/10 and which does not significantly interfere with daily or work duties. 3.  Patient will demonstrate cervical AROM to be [facility] for improved safety with functional mobility. OUTCOME MEASURE:   Tool Used: Neck Disability Index (NDI)  Score:  Initial: 14/50  Most Recent: X/50 (Date: -- )   Interpretation of Score: The Neck Disability Index is a revised form of the Oswestry Low Back Pain Index and is designed to measure the activities of daily living in person's with neck pain. Each section is scored on a 0-5 scale, 5 representing the greatest disability. The scores of each section are added together for a total score of 50. MEDICAL NECESSITY:   · Patient is expected to demonstrate progress in strength, range of motion and functional technique to improve safety during functional mobility. REASON FOR SERVICES/OTHER COMMENTS:  · Patient continues to require skilled intervention due to not reaching long term goals. Total Duration:  PT Patient Time In/Time Out  Time In: 1100  Time Out: 1140    Rehabilitation Potential For Stated Goals: Good  Regarding Thais Matthews's therapy, I certify that the treatment plan above will be carried out by a therapist or under their direction. Thank you for this referral,  Marjan Landers, PT, DPT     Referring Physician Signature: Sienna Payne MD No Signature is Required for this note. PAIN/SUBJECTIVE:   Initial: Pain Intensity 1: 2  Pain Location 1: Neck  Post Session:  1-2/10   HISTORY:   History of Injury/Illness (Reason for Referral):  Patient reports insidious onset of neck pain, beginning around 2-3 weeks ago. She reports stiffness at pain at the neck, particularly along paraspinals and upper trap.  She rates pain as 8/10 at worst, without dizziness, vision issues, sensory deficits. Past Medical History/Comorbidities:   Ms. Gregor Ramos  has a past medical history of Anemia, IBS (irritable bowel syndrome), Mixed conductive and sensorineural hearing loss, Unspecified hypothyroidism (2/26/2013), and Unspecified vitamin D deficiency. Ms. Gregor Ramos  has a past surgical history that includes hx breast lumpectomy; hx total abdominal hysterectomy (1986); hx hemorrhoidectomy (1968); pr appendectomy; hx other surgical; hx other surgical; hx other surgical; hx cataract removal (Bilateral, 2018); hx breast biopsy (Right, over 30 yrs ago); and hx breast biopsy (Left, over 30 yrs ago). Social History/Living Environment:     Independent   Prior Level of Function/Work/Activity:  Retired nurse   Personal Factors:          Sex:  female        Age:  66 y.o. Ambulatory/Rehab Services H2 Model Falls Risk Assessment   Risk Factors:       No Risk Factors Identified Ability to Rise from Chair:       (0)  Ability to rise in a single movement   Falls Prevention Plan:       No modifications necessary   Total: (5 or greater = High Risk): 0   ©2010 [facility] [employer]. All Rights Reserved. [facility] Patent #[id number]. Federal Law prohibits the replication, distribution or use without written permission from [facility] [employer]   Current Medications:       Current Outpatient Medications:     ferrous gluconate (FERGON) 240 mg (27 mg iron) tablet, Take 240 mg by mouth three (3) times daily (with meals). , Disp: , Rfl:     levothyroxine (SYNTHROID) 50 mcg tablet, TAKE ONE TABLET BY MOUTH EVERY DAY, Disp: 30 Tab, Rfl: 11    cod liver oil cap, Take  by mouth., Disp: , Rfl:     biotin 10 mg tab, Take  by mouth., Disp: , Rfl:     docusate sodium (COLACE) 100 mg capsule, Take 100 mg by mouth., Disp: , Rfl:     CALCIUM POLYCARBOPHIL (FIBERCON PO), Take  by mouth., Disp: , Rfl:     DOCUSATE CALCIUM (STOOL SOFTENER PO), Take  by mouth., Disp: , Rfl:     calcium citrate-vitamin D3 (CITRACAL WITH VITAMIN D MAXIMUM) tablet, Take  by mouth two (2) times a day., Disp: , Rfl:     cholecalciferol, vitamin D3, (VITAMIN D3) 2,000 unit Tab, Take  by mouth., Disp: , Rfl:    Date Last Reviewed:  5/4/2022     Number of Personal Factors/Comorbidities that affect the Plan of Care: 0: LOW COMPLEXITY   EXAMINATION:   ROM:          Cervical AROM: B/L side-bend 75%, rotation 75%, flexion 75%, extension 50%

## 2022-05-04 NOTE — PROGRESS NOTES
Rosa Nur  : 1950  [place]  Secondary: [address] [facility] at [address] [facility], [facility], [address] [place] [address]  [phone]   [phone]        OUTPATIENT PHYSICAL THERAPY: Daily Treatment Note 2022  Visit Count:  10    ICD-10: Treatment Diagnosis: Neck pain (M54.2)  Precautions/Allergies:   Asa-acetaminophen-caff-buffers, Darvon [propoxyphene], Myrbetriq [mirabegron], and Sulfa (sulfonamide antibiotics)   TREATMENT PLAN:  Effective Dates: 2022 TO 2022 (60 days). Frequency/Duration: 2 times a week for 60 Day(s) MEDICAL/REFERRING DIAGNOSIS:  Cervicalgia [M54.2]   DATE OF ONSET: 2022  REFERRING PHYSICIAN: Bill Seay MD MD Orders: evaluate and treat  Return MD Appointment: May 23, 2022       Pre-treatment Symptoms/Complaints:  Patient reports she feels she is doing much better and is much more aware of her movement and posture, which has helped significantly. Pain: Initial: Pain Intensity 1: 2  Pain Location 1: Neck  Post Session:  2/10   Medications Last Reviewed:  2022  Updated Objective Findings:  None today  TREATMENT:       THERAPEUTIC EXERCISE: (30 minutes):  Exercises per grid below to improve mobility and strength. Required moderate visual, verbal and manual cues to promote proper body alignment, promote proper body posture, promote proper body mechanics and promote proper body breathing techniques. Progressed resistance, range, repetitions and complexity of movement as indicated.      Date:  22 Date:  22 Date:  22 Date  22 Date:  22 Date  22 Date:  22   Activity/Exercise Parameters Parameters Parameters       UBE Level 1, 5 minutes Level 1   4 minutes   Level 1   4 minutes 5 minutes  Level 1 6 minutes  Level 1 6 minutes  Level 1 8 minutes  Level 1   Cervical AROM (6 planes) x20 each, gentle         Scapular retractions Hold 3 x 20 Posterior shoulder rolls x10 hold 3     x10     Seated cervical retraction  x20 hold 3    x10     Upper trap stretch with hand anchored Hold 30 x3 B    [id number] each     Levator scap stretch with hand anchored Hold 30 x 3 B    [id number] each     Rows      25 lb  2x10  25 lb  2x10    Pec fly machine      25 lb  2x10  25 lb  2x10    hooklying cervical retraction into towel roll  Hold 3 x 10        Shoulder extension     Blue band  3x10   Red x 20  Green x 20   Hold 3   Standing rows  Red x10 Blue band  3x10  Red x 20  Green x 20 hold 5 each   Corner chest stretch  Hold 30 x 3        Doorway chest stretch  Hold 30 x3    3x30 sec each 3x30 seconds   Upper thoracic stretch      Hold 30 x 3     Gentle cervical isometric (side B, flex, ext)       Hold 3 x 10   Bent over row       2# x 20 each   Single UE OH press with postural emphasis       x10 each     MANUAL THERAPY: (15 minutes): Joint mobilization and Soft tissue mobilization was utilized and necessary because of the patient's restricted joint motion and restricted motion of soft tissue. Patient received STM to B/L cervical paraspinals and upper trap, patient seated and hooklying, with freeup massage cream        Treatment/Session Summary:    · Response to Treatment:  Patient is responding very well to the current program, focused on postural strengthening and awareness, and STM to upper trap   · Communication/Consultation:  Updated written HEP and issued red theraband. Demonstrated theracane and discussed what to look out for when purchasing a new pillow. Discussed postural awareness when doing overhead reaching motions. · Equipment provided today:  None today  · Recommendations/Intent for next treatment session: Next visit will focus on gentle progression of cervical motion, postural strengthening, manual work.     Total Treatment Billable Duration:  45 minutes - 30 minutes therapeutic exercise, 15 minutes manual therapy   PT Patient Time In/Time Out  Time In: 1100  Time Out: 1160 Carla Bearden, DPT    Future Appointments   Date Time Provider Jeannette Russ   5/9/2022  1:00 PM Jewel Lang PTA [facility]   5/12/2022  1:00 PM Kim Massey PT, DPT [facility]   7/20/2022 10:15 AM Helen Nguyen MD [address] KERNS

## 2022-05-09 ENCOUNTER — HOSPITAL ENCOUNTER (OUTPATIENT)
Dept: PHYSICAL THERAPY | Age: 72
Discharge: HOME | End: 2022-05-09
Payer: MEDICARE

## 2022-05-09 PROCEDURE — 9990 CHARGE CONVERSION

## 2022-05-09 PROCEDURE — 97110 THERAPEUTIC EXERCISES: CPT

## 2022-05-09 PROCEDURE — 97140 MANUAL THERAPY 1/> REGIONS: CPT

## 2022-05-09 NOTE — PROGRESS NOTES
Ade Gil  : 1950  [place]  Secondary: [address] [facility] at [address]  [phone]   [phone]        OUTPATIENT PHYSICAL THERAPY: Daily Treatment Note 2022  Visit Count:  11    ICD-10: Treatment Diagnosis: Neck pain (M54.2)  Precautions/Allergies:   Asa-acetaminophen-caff-buffers, Darvon [propoxyphene], Myrbetriq [mirabegron], and Sulfa (sulfonamide antibiotics)   TREATMENT PLAN:  Effective Dates: 2022 TO 2022 (60 days). Frequency/Duration: 2 times a week for 60 Day(s) MEDICAL/REFERRING DIAGNOSIS:  Cervicalgia [M54.2]   DATE OF ONSET: 2022  REFERRING PHYSICIAN: Jace Romero MD MD Orders: evaluate and treat  Return MD Appointment: May 23, 2022       Pre-treatment Symptoms/Complaints:  Patient reports she feels she didn't sleep well last night and is a little tight, but doing OK. Pain: Initial: Pain Intensity 1: 2  Pain Location 1: Neck  Post Session:  2/10   Medications Last Reviewed:  2022  Updated Objective Findings:  None today  TREATMENT:       THERAPEUTIC EXERCISE: (20 minutes):  Exercises per grid below to improve mobility and strength. Required moderate visual, verbal and manual cues to promote proper body alignment, promote proper body posture, promote proper body mechanics and promote proper body breathing techniques. Progressed resistance, range, repetitions and complexity of movement as indicated.      Date:  22 Date:  22 Date:  22 Date  22 Date:  22 Date  22 Date:  22 Date:  22   Activity/Exercise Parameters Parameters Parameters        UBE Level 1, 5 minutes Level 1   4 minutes   Level 1   4 minutes 5 minutes  Level 1 6 minutes  Level 1 6 minutes  Level 1 8 minutes  Level 1 8 minutes  Level 1     Cervical AROM (6 planes) x20 each, gentle          Scapular retractions Hold 3 x 20 Posterior shoulder rolls x10 hold 3     x10      Seated cervical retraction  x20 hold 3    x10      Upper trap stretch with hand anchored Hold 30 x3 B    [id number] each   [id number]   Levator scap stretch with hand anchored Hold 30 x 3 B    [id number] each   [id number]   Rows      25 lb  2x10  25 lb  2x10     Pec fly machine      25 lb  2x10  25 lb  2x10     hooklying cervical retraction into towel roll  Hold 3 x 10         Shoulder extension     Blue band  3x10   Red x 20  Green x 20   Hold 3    Standing rows  Red x10 Blue band  3x10  Red x 20  Green x 20 hold 5 each Blue x20   Corner chest stretch  Hold 30 x 3         Doorway chest stretch  Hold 30 x3    3x30 sec each 3x30 seconds    Upper thoracic stretch      Hold 30 x 3      Gentle cervical isometric (side B, flex, ext)       Hold 3 x 10    Bent over row       2# x 20 each reviewed   Single UE OH press with postural emphasis       x10 each 1# x 10 each   Single UE standing protraction        Green theraband x 10   Single UE high row         Green theraband  x10     MANUAL THERAPY: (30 minutes): Joint mobilization and Soft tissue mobilization was utilized and necessary because of the patient's restricted joint motion and restricted motion of soft tissue. Patient received STM to B/L cervical paraspinals and upper trap, patient seated and hooklying, with freeup massage cream  Patient received gentle cervical side bend stretch in hooklying [id number] each with gentle scapular depression overpressure. Treatment/Session Summary:    · Response to Treatment:  Patient reports increased flexibility and decreased tension and pain after therapy session today. Patient demonstrated good scapular and upper body postural control during exercises today. · Communication/Consultation:  Updated written HEP and issued red theraband. Demonstrated theracane and discussed what to look out for when purchasing a new pillow.  Discussed postural awareness when doing overhead reaching motions. · Equipment provided today:  Blue and green theraband and updated written HEP  · Recommendations/Intent for next treatment session: Next visit will focus on gentle progression of cervical motion, postural strengthening, manual work.     Total Treatment Billable Duration:  50 minutes - 20 minutes therapeutic exercise, 30 minutes manual therapy   PT Patient Time In/Time Out  Time In: 6273  Time Out: [address] [facility]    Future Appointments   Date Time Provider Jeannette Petrona   5/12/2022  1:00 PM Jamar Mitchell, PT, DPT [facility]

## 2022-05-12 ENCOUNTER — HOSPITAL ENCOUNTER (OUTPATIENT)
Dept: PHYSICAL THERAPY | Age: 72
Discharge: HOME | End: 2022-05-12
Payer: MEDICARE

## 2022-05-12 PROCEDURE — 97110 THERAPEUTIC EXERCISES: CPT

## 2022-05-12 PROCEDURE — 97140 MANUAL THERAPY 1/> REGIONS: CPT

## 2022-05-12 PROCEDURE — 9990 CHARGE CONVERSION

## 2022-05-12 NOTE — PROGRESS NOTES
Cathleen Ram  : 1950  [place]  Secondary: [address] [facility] at [address]  [phone]   [phone]        OUTPATIENT PHYSICAL THERAPY: Daily Treatment Note 2022  Visit Count:  12    ICD-10: Treatment Diagnosis: Neck pain (M54.2)  Precautions/Allergies:   Asa-acetaminophen-caff-buffers, Darvon [propoxyphene], Myrbetriq [mirabegron], and Sulfa (sulfonamide antibiotics)   TREATMENT PLAN:  Effective Dates: 2022 TO 2022 (60 days). Frequency/Duration: 2 times a week for 60 Day(s) MEDICAL/REFERRING DIAGNOSIS:  Cervicalgia [M54.2]   DATE OF ONSET: 2022  REFERRING PHYSICIAN: Armin Ramirez MD MD Orders: evaluate and treat  Return MD Appointment: May 23, 2022       Pre-treatment Symptoms/Complaints:  Patient reports she is doing well, still noticing greatest stiffness at lateral neck    Pain: Initial: Pain Intensity 1: 2  Pain Location 1: Neck  Post Session:  2/10   Medications Last Reviewed:  2022  Updated Objective Findings:  None today  TREATMENT:       THERAPEUTIC EXERCISE: (25 minutes):  Exercises per grid below to improve mobility and strength. Required moderate visual, verbal and manual cues to promote proper body alignment, promote proper body posture, promote proper body mechanics and promote proper body breathing techniques. Progressed resistance, range, repetitions and complexity of movement as indicated.      Date:  22 Date:  22 Date:  22 Date  22 Date:  22 Date  22 Date:  22 Date:  22   Activity/Exercise Parameters Parameters Parameters        UBE Level 1, 5 minutes Level 1   4 minutes   Level 1   4 minutes 5 minutes  Level 1 6 minutes  Level 1 6 minutes  Level 1 8 minutes  Level 1 8 minutes  Level 1     Cervical AROM (6 planes) x20 each, gentle          Scapular retractions Hold 3 x 20          Posterior shoulder rolls x10 hold 3     x10      Seated cervical retraction  x20 hold 3    x10      Upper trap stretch with hand anchored Hold 30 x3 B    [id number] each   [id number]   Levator scap stretch with hand anchored Hold 30 x 3 B    4g95tyligne each   [id number]   Rows      25 lb  2x10  25 lb  2x10     Pec fly machine      25 lb  2x10  25 lb  2x10     hooklying cervical retraction into towel roll  Hold 3 x 10         Shoulder extension     Blue band  3x10   Red x 20  Green x 20   Hold 3    Standing rows  Red x10 Blue band  3x10  Red x 20  Green x 20 hold 5 each Blue x20   Corner chest stretch  Hold 30 x 3         Doorway chest stretch  Hold 30 x3    3x30 sec each 3x30 seconds    Upper thoracic stretch      Hold 30 x 3      Gentle cervical isometric (side B, flex, ext)       Hold 3 x 10    Bent over row       2# x 20 each reviewed   Single UE OH press with postural emphasis       x10 each 1# x 10 each   Single UE standing protraction        Green theraband x 10   Single UE high row         Green theraband  x10     MANUAL THERAPY: (15 minutes): Joint mobilization and Soft tissue mobilization was utilized and necessary because of the patient's restricted joint motion and restricted motion of soft tissue. Patient received STM to B/L cervical paraspinals and upper trap, patient seated and hooklying, with freeup massage cream  Patient received gentle cervical side bend stretch in hooklying [id number] each with gentle scapular depression overpressure. Treatment/Session Summary:    · Response to Treatment:  Patient reports increased flexibility and decreased tension and pain after therapy session today. Patient demonstrated good scapular and upper body postural control during exercises today. · Communication/Consultation:  Updated written HEP and issued red theraband. Demonstrated theracane and discussed what to look out for when purchasing a new pillow.  Discussed postural awareness when doing overhead reaching motions. · Equipment provided today:  Blue and green theraband and updated written HEP  · Recommendations/Intent for next treatment session: Next visit will focus on gentle progression of cervical motion, postural strengthening, manual work.     Total Treatment Billable Duration:  40 minutes - 25 minutes therapeutic exercise, 15 minutes manual therapy   PT Patient Time In/Time Out  Time In: 1300  Time Out: 60 La Reyes, PT, DPT    Future Appointments   Date Time Provider Jeannette Russ   5/16/2022  1:00 PM Preet Mccallum PTA [facility]   5/18/2022  1:00 PM Preet cMcallum PTA [facility]

## 2022-05-16 ENCOUNTER — HOSPITAL ENCOUNTER (OUTPATIENT)
Dept: PHYSICAL THERAPY | Age: 72
Discharge: HOME | End: 2022-05-16
Payer: MEDICARE

## 2022-05-16 PROCEDURE — 97110 THERAPEUTIC EXERCISES: CPT

## 2022-05-16 PROCEDURE — 97140 MANUAL THERAPY 1/> REGIONS: CPT

## 2022-05-16 PROCEDURE — 9990 CHARGE CONVERSION

## 2022-05-16 NOTE — PROGRESS NOTES
Tray Vann  : 1950  [place]  Secondary: [address] [facility] at [address]  [phone]   [phone]        OUTPATIENT PHYSICAL THERAPY: Daily Treatment Note 2022  Visit Count:  13    ICD-10: Treatment Diagnosis: Neck pain (M54.2)  Precautions/Allergies:   Asa-acetaminophen-caff-buffers, Darvon [propoxyphene], Myrbetriq [mirabegron], and Sulfa (sulfonamide antibiotics)   TREATMENT PLAN:  Effective Dates: 2022 TO 2022 (60 days). Frequency/Duration: 2 times a week for 60 Day(s) MEDICAL/REFERRING DIAGNOSIS:  Cervicalgia [M54.2]   DATE OF ONSET: 2022  REFERRING PHYSICIAN: Caro Montelongo MD MD Orders: evaluate and treat  Return MD Appointment: May 23, 2022       Pre-treatment Symptoms/Complaints:  Patient reports she is doing well, she has not have significant pain in a couple of week, but notes that some tension or R side continues to make her neck ROM feel limited. Pain: Initial: Pain Intensity 1: 0  Pain Location 1: Neck  Post Session:  0/10   Medications Last Reviewed:  2022  Updated Objective Findings:  R cervical rotation 42 degrees before manual treatment, 52 degrees after manual treatment, L cervical rotation 56  TREATMENT:       THERAPEUTIC EXERCISE: (25 minutes):  Exercises per grid below to improve mobility and strength. Required moderate visual, verbal and manual cues to promote proper body alignment, promote proper body posture, promote proper body mechanics and promote proper body breathing techniques. Progressed resistance, range, repetitions and complexity of movement as indicated.      Date:  22 Date:  22 Date:  22 Date  22 Date:  22 Date  22 Date:  22 Date:  22 Date:     Activity/Exercise Parameters Parameters Parameters         UBE Level 1, 5 minutes Level 1   4 minutes   Level 1   4 minutes 5 minutes  Level 1 6 minutes  Level 1 6 minutes  Level 1 8 minutes  Level 1 8 minutes  Level 1   8 minutes  Level 1   Cervical AROM (6 planes) x20 each, gentle           Scapular retractions Hold 3 x 20           Posterior shoulder rolls x10 hold 3     x10       Seated cervical retraction  x20 hold 3    x10       Upper trap stretch with hand anchored Hold 30 x3 B    [id number] each   [id number]    Levator scap stretch with hand anchored Hold 30 x 3 B    8r25kilbele each   [id number]    Rows      25 lb  2x10  25 lb  2x10      Pec fly machine      25 lb  2x10  25 lb  2x10      hooklying cervical retraction into towel roll  Hold 3 x 10          Shoulder extension     Blue band  3x10   Red x 20  Green x 20   Hold 3  Green   3x10   Standing rows  Red x10 Blue band  3x10  Red x 20  Green x 20 hold 5 each Blue x20 Green 3x10   Corner chest stretch  Hold 30 x 3          Doorway chest stretch  Hold 30 x3    3x30 sec each 3x30 seconds     Upper thoracic stretch      Hold 30 x 3       Gentle cervical isometric (side B, flex, ext)       Hold 3 x 10     Bent over row       2# x 20 each reviewed    Single UE OH press with postural emphasis       x10 each 1# x 10 each    Single UE standing protraction        Green theraband x 10    Single UE high row         Green theraband  x10      MANUAL THERAPY: (30 minutes): Joint mobilization and Soft tissue mobilization was utilized and necessary because of the patient's restricted joint motion and restricted motion of soft tissue. Patient received STM to B/L cervical paraspinals and upper trap, patient seated and hooklying, with freeup massage cream  Patient received gentle cervical side bend stretch in hooklying [id number] each with gentle scapular depression overpressure. Treatment/Session Summary:    · Response to Treatment:  Patient reports increased flexibility and decreased tension and pain after therapy session today.  Noted increasd cervical sidebending to the L and increased cervical rotation ROM after manual therapies. Patient is progressing well towards goals. · Communication/Consultation:  Updated written HEP and issued red theraband. Demonstrated theracane and discussed what to look out for when purchasing a new pillow. Discussed postural awareness when doing overhead reaching motions. · Equipment provided today:  Blue and green theraband and updated written HEP  · Recommendations/Intent for next treatment session: Next visit will focus on gentle progression of cervical motion, postural strengthening, manual work.     Total Treatment Billable Duration:  55 minutes - 25 minutes therapeutic exercise, 30 minutes manual therapy   PT Patient Time In/Time Out  Time In: 1300  Time Out: [address], [facility]    Future Appointments   Date Time Provider Jeannette Petrona   5/18/2022  1:00 PM Jewel Lang PTA [facility]

## 2022-05-18 ENCOUNTER — HOSPITAL ENCOUNTER (OUTPATIENT)
Dept: PHYSICAL THERAPY | Age: 72
Discharge: HOME | End: 2022-05-18
Payer: MEDICARE

## 2022-05-18 PROCEDURE — 97140 MANUAL THERAPY 1/> REGIONS: CPT

## 2022-05-18 PROCEDURE — 97110 THERAPEUTIC EXERCISES: CPT

## 2022-05-18 PROCEDURE — 9990 CHARGE CONVERSION

## 2022-05-18 NOTE — PROGRESS NOTES
Erendira Harvey  : 1950  [place]  Secondary: [address] [facility] at [address]  [phone]   [phone]        OUTPATIENT PHYSICAL THERAPY: Daily Treatment Note 2022  Visit Count:  14    ICD-10: Treatment Diagnosis: Neck pain (M54.2)  Precautions/Allergies:   Asa-acetaminophen-caff-buffers, Darvon [propoxyphene], Myrbetriq [mirabegron], and Sulfa (sulfonamide antibiotics)   TREATMENT PLAN:  Effective Dates: 2022 TO 2022 (60 days). Frequency/Duration: 2 times a week for 60 Day(s) MEDICAL/REFERRING DIAGNOSIS:  Cervicalgia [M54.2]   DATE OF ONSET: 2022  REFERRING PHYSICIAN: Jaime Carlson MD MD Orders: evaluate and treat  Return MD Appointment: May 23, 2022       Pre-treatment Symptoms/Complaints:  Patient reports she is doing well, she says that her ROM has been significantly improved since her last visit and feels that she is 90% of her normal in comparison to before her neck injury. Pain: Initial: Pain Intensity 1: 0  Pain Location 1: Neck  Post Session:  0/10   Medications Last Reviewed:  2022  Updated Objective Findings:  None today  TREATMENT:       THERAPEUTIC EXERCISE: (30 minutes):  Exercises per grid below to improve mobility and strength. Required moderate visual, verbal and manual cues to promote proper body alignment, promote proper body posture, promote proper body mechanics and promote proper body breathing techniques. Progressed resistance, range, repetitions and complexity of movement as indicated.      Date:  22 Date:  22 Date:  22 Date  22 Date:  22 Date  22 Date:  22 Date:  22 Date:  22 Date:22   Activity/Exercise Parameters Parameters Parameters          UBE Level 1, 5 minutes Level 1   4 minutes   Level 1   4 minutes 5 minutes  Level 1 6 minutes  Level 1 6 minutes  Level 1 8 minutes  Level 1 8 minutes  Level 1   8 minutes  Level 1 8 minutes  Level 1   Cervical AROM (6 planes) x20 each, gentle            Scapular retractions Hold 3 x 20            Posterior shoulder rolls x10 hold 3     x10        Seated cervical retraction  x20 hold 3    x10        Upper trap stretch with hand anchored Hold 30 x3 B    [id number] each   [id number]     Levator scap stretch with hand anchored Hold 30 x 3 B    3b23reijbhq each   [id number]     Rows      25 lb  2x10  25 lb  2x10       Pec fly machine      25 lb  2x10  25 lb  2x10       hooklying cervical retraction into towel roll  Hold 3 x 10           Shoulder extension     Blue band  3x10   Red x 20  Green x 20   Hold 3  Green   3x10 Green   3x10   Standing rows  Red x10 Blue band  3x10  Red x 20  Green x 20 hold 5 each Blue x20 Green 3x10 Green 3x10   Corner chest stretch  Hold 30 x 3           Doorway chest stretch  Hold 30 x3    3x30 sec each 3x30 seconds      Upper thoracic stretch      Hold 30 x 3        Gentle cervical isometric (side B, flex, ext)       Hold 3 x 10      Bent over row       2# x 20 each reviewed     Single UE OH press with postural emphasis       x10 each 1# x 10 each     Single UE standing protraction        Green theraband x 10     Single UE high row         Green theraband  x10     Pool noodle posture exercises          Rows x 10  Deltoids x 10  abduction to 90 x 10  OH press x 10   Upper thoracic stretch          Hold 30 x 3     MANUAL THERAPY: (20 minutes): Joint mobilization and Soft tissue mobilization was utilized and necessary because of the patient's restricted joint motion and restricted motion of soft tissue. Patient received STM to B/L cervical paraspinals and upper trap, patient seated and hooklying, with freeup massage cream  Patient received gentle cervical side bend stretch in hooklying [id number] each with gentle scapular depression overpressure.          Treatment/Session Summary:    · Response to Treatment: Patient reports she is not limiting any activities and is feeling like she knows what to do when her neck starts to get tight. Discussed transitioning to general exercise program and what that might look like. · Communication/Consultation:  Updated written HEP and issued red theraband. Demonstrated theracane and discussed what to look out for when purchasing a new pillow. Discussed postural awareness when doing overhead reaching motions. · Equipment provided today:  Blue and green theraband and updated written HEP  · Recommendations/Intent for next treatment session: Next visit will focus on gentle progression of cervical motion, postural strengthening, manual work. Total Treatment Billable Duration:  50 minutes - 30 minutes therapeutic exercise, 20 minutes manual therapy   PT Patient Time In/Time Out  Time In: 1255  Time Out: [address], [facility]    No future appointments.

## 2022-05-20 NOTE — PROGRESS NOTES
I am accessing Ms. Matthews's chart as a part of our department's internal chart auditing process. I certify that Ms. Ada Juarez is, or was, a patient in our department.   Thank you,  Shikha Coyle, PT  5/20/2022

## 2022-05-23 ENCOUNTER — HOSPITAL ENCOUNTER (OUTPATIENT)
Dept: PHYSICAL THERAPY | Age: 72
Setting detail: RECURRING SERIES
Discharge: HOME | End: 2022-05-26
Payer: MEDICARE

## 2022-05-23 PROCEDURE — 97110 THERAPEUTIC EXERCISES: CPT

## 2022-05-23 PROCEDURE — 97140 MANUAL THERAPY 1/> REGIONS: CPT

## 2022-05-23 ASSESSMENT — PAIN SCALES - GENERAL: PAINLEVEL_OUTOF10: 2

## 2022-05-23 ASSESSMENT — PAIN DESCRIPTION - LOCATION: LOCATION: NECK

## 2022-05-23 NOTE — PROGRESS NOTES
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   Initial:   Neck 2/10 Post Session:   Neck 1/10  Medications Last Reviewed:  2022  Updated Objective Findings:  None Today  Treatment   THERAPEUTIC EXERCISE: (25 minutes):    Exercises per grid below to improve mobility and strength. Required minimal visual and verbal cues to promote proper body alignment, promote proper body posture and promote proper body mechanics. Progressed complexity of movement as indicated. Date:  22 Date:   Date:     Activity/Exercise Parameters Parameters Parameters   UBE   7 minutes  Level 1     Upper trap stretch    3x30 seconds     Rows   Blue band  3x10     B/L shoulder extension   Blue band  2x10 level, 2x10 elevated anchor in wall                         MANUAL THERAPY: (15 minutes): Soft tissue mobilization was utilized and necessary because of the patient's restricted joint motion and restricted motion of soft tissue.    Patient received STM to B/L upper trap in sitting, 15 minutes        Treatment/Session Summary:    · Treatment Assessment:   Patient did very well with continued progression. She is agreeable to discharge next visit with referral to the prep program  · Communication/Consultation:  None today  · Equipment provided today:  None  · Recommendations/Intent for next treatment session: Next visit will focus on discharge review of HEP.     Total Treatment Billable Duration:  40 minutes - 25 minutes therapeutic exercise, 15 minutes manual therapy   Time In: 1300  Time Out: 1345    Vinicius Grossman, PT       Post Session Pain  Charge Capture  [employer] Portal  MD Guidelines  Scanned Media  Benefits  MyChart

## 2022-05-25 ENCOUNTER — HOSPITAL ENCOUNTER (OUTPATIENT)
Dept: PHYSICAL THERAPY | Age: 72
Setting detail: RECURRING SERIES
Discharge: HOME | End: 2022-05-28
Payer: MEDICARE

## 2022-05-25 PROCEDURE — 97140 MANUAL THERAPY 1/> REGIONS: CPT

## 2022-05-25 ASSESSMENT — PAIN SCALES - GENERAL: PAINLEVEL_OUTOF10: 0

## 2022-05-25 NOTE — PROGRESS NOTES
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 Initial:     0/10 Post Session:     0/10  Medications Last Reviewed:  2022  Updated Objective Findings:  None Today  Treatment   THERAPEUTIC EXERCISE: (0 minutes):    Exercises per grid below to improve mobility and strength. Required minimal visual and verbal cues to promote proper body alignment, promote proper body posture and promote proper body mechanics. Progressed complexity of movement as indicated. Date:  22 Date:  22 Date:     Activity/Exercise Parameters Parameters Parameters   UBE   7 minutes  Level 1 Not today    Upper trap stretch    3x30 seconds     Rows   Blue band  3x10     B/L shoulder extension   Blue band  2x10 level, 2x10 elevated anchor in wall                         MANUAL THERAPY: (25 minutes): Soft tissue mobilization was utilized and necessary because of the patient's restricted joint motion and restricted motion of soft tissue.    Patient received STM to B/L upper trap and cervical paraspinals in sitting/supine        Treatment/Session Summary:    · Treatment Assessment:   Patient and therapist decided to hold on discharge today due to increased pain over the weekend and patient wanting to get new pillow to assess benefit before discharge and attempt to wean off of muscle relaxers. Scheduled next visit in 2 weeks to check in with PT.  · Communication/Consultation:  Discussed transition to PREP program when patient feels ready  · Equipment provided today:  None  · Recommendations/Intent for next treatment session: Next visit will focus on discharge review of HEP.     Total Treatment Billable Duration:  25 minutes - 25 minutes manual therapy   Time In: 1140  Time Out: 600 [facility]       Post Session Pain  Charge Capture  [employer] Portal  MD Guidelines  Scanned Media  Benefits  MyChart

## 2022-06-13 ENCOUNTER — HOSPITAL ENCOUNTER (OUTPATIENT)
Dept: PHYSICAL THERAPY | Age: 72
Setting detail: RECURRING SERIES
Discharge: HOME | End: 2022-06-16
Payer: MEDICARE

## 2022-06-13 PROCEDURE — 97110 THERAPEUTIC EXERCISES: CPT

## 2022-06-13 PROCEDURE — 97140 MANUAL THERAPY 1/> REGIONS: CPT

## 2022-06-13 ASSESSMENT — PAIN SCALES - GENERAL: PAINLEVEL_OUTOF10: 1

## 2022-06-13 ASSESSMENT — PAIN DESCRIPTION - LOCATION: LOCATION: NECK

## 2022-06-13 NOTE — THERAPY RECERTIFICATION
Yaima Link  : 1950  Primary: Medicare Part A And B  Secondary:  [id number] @ [address] [facility] [place] [address]  Phone: [phone]  Fax: [phone] Plan Frequency: 1x every 2 weeks    Plan of Care/Certification Expiration Date: 22      PT Visit Info:    No data recorded    OUTPATIENT PHYSICAL THERAPY:OP NOTE TYPE: Recertification                Episode  Appt Desk         Treatment Diagnosis:  Generalized Muscle Weakness (M62.81)  Cervicalgia (M54.2)  Medical/Referring Diagnosis:  Cervicalgia [M54.2]  Referring Physician:  Aman Mireles MD MD Orders:  PT Eval and Treat   Return MD Appt:  Not set  Date of Onset:  Onset Date: 22     Allergies:  Mirabegron, Propoxyphene, and Sulfa antibiotics  Restrictions/Precautions:    Restrictions/Precautions: None  No data recorded   Medications Last Reviewed:  2022     SUBJECTIVE   History of Injury/Illness (Reason for Referral):  Patient reports insidious onset of neck pain, beginning around 2-3 weeks ago. She reports stiffness at pain at the neck, particularly along paraspinals and upper trap. She rates pain as 8/10 at worst, without dizziness, vision issues, sensory deficits  Patient Stated Goal(s):  \"pain-free neck\"  Initial:   Neck 1/10 Post Session:   Neck 0/10  Past Medical History/Comorbidities:   Ms. Yvan López  has a past medical history of Anemia, IBS (irritable bowel syndrome), Mixed conductive and sensorineural hearing loss, Unspecified hypothyroidism, and Unspecified vitamin D deficiency. Ms. Yvan López  has a past surgical history that includes Breast lumpectomy; Hysterectomy, total abdominal (); Breast biopsy (Left, over 30 yrs ago); Breast biopsy (Right, over 30 yrs ago); Cataract removal (Bilateral, ); Hemorrhoid surgery (); other surgical history; other surgical history; pr appendectomy; and other surgical history.   Social History/Living Environment: Independent   No data recorded   Prior Level of Function/Work/Activity: Not working   No data [address] [facility] recorded   Learning: No barriers   No data recorded   Fall Risk Scale: Total Score: 0  Joshua Fall Risk: Low (0-24)     Dominant Side:  right handed    Personal Factors:        Sex:  female        Age:  66 y.o. OBJECTIVE   Cervical:  AROM Cervical Spine   Cervical spine general AROM: Cervical flexion 90%, extension 75%, B/L rotation and side-bend 75%    ASSESSMENT   Re- Assessment:  Ms. Arik Singer is doing very well in PT, nearing completion of her long term goals. Plan is to extend her POC to cover 1-2 more visits, to ensure proper transition to independent rehab and continued exercise. Problem List: (Impacting functional limitations): Body Structures, Functions, Activity Limitations Requiring Skilled Therapeutic Intervention: Decreased functional mobility ; Decreased ROM; Decreased body mechanics; Decreased strength; Increased pain     Therapy Prognosis:   Therapy Prognosis: Excellent     Assessment Complexity:   Low Complexity  PLAN   Effective Dates: 6/13/22 TO Plan of Care/Certification Expiration Date: 08/05/22     Frequency/Duration: Plan Frequency: 1x every 2 weeks     Interventions Planned (Treatment may consist of any combination of the following):    Current Treatment Recommendations: Strengthening; ROM; Manual Therapy - Soft Tissue Mobilization     SHORT-TERM FUNCTIONAL GOALS: Time Frame: 3 weeks  1. Patient will be compliant with HEP focused on cervical/postural stabilization and strength/ROM. GOAL MET 6/13/2022  2. Patient will rate neck pain no greater than 5/10 at worst for improved tolerance to daily and work activities. GOAL MET 6/13/2022  DISCHARGE GOALS: Time Frame: 6 weeks  1. Patient will be independent with comprehensive HEP focused on cervical stabilization and postural strengthening.    2.  Patient will rate neck pain no greater than 3/10 and which does not significantly interfere with daily or work duties. GOAL MET 6/13/2022  3. Patient will demonstrate cervical AROM to be [facility] for improved safety with functional mobility. Outcome Measure: Tool Used: Neck Disability Index (NDI)  Score:  Initial: 14/50  Most Recent: 3/50 (Date: 6-13-22 )   Interpretation of Score: The Neck Disability Index is a revised form of the Oswestry Low Back Pain Index and is designed to measure the activities of daily living in person's with neck pain. Each section is scored on a 0-5 scale, 5 representing the greatest disability. The scores of each section are added together for a total score of 50. Medical Necessity:    Patient is expected to demonstrate progress in strength and range of motion to improve safety during functional mobility. Reason For Services/Other Comments:   Patient continues to require skilled intervention due to not reaching long term goals. Total Duration:  Time In: 1100  Time Out: 36    Regarding Annette Recinos's therapy, I certify that the treatment plan above will be carried out by a therapist or under their direction.   Thank you for this referral,  Andrew Ayers, PT     Referring Physician Signature: Angely Owen MD _______________________________ Date _____________        Post Session Pain  Charge Capture  PT Visit Info  POC Link  Treatment Note Link  MD Guidelines  [facility]

## 2022-06-13 NOTE — PROGRESS NOTES
Brandy Mau  : 1950  Primary: Medicare Part A And B  Secondary:  [address] @ [address] [address]  Phone: [phone]  Fax: [phone] No data recorded  No data recorded    PT Visit Info:    No data recorded    OUTPATIENT PHYSICAL THERAPY:OP NOTE TYPE: Treatment Note 2022     Appt Desk   Episode      Treatment Diagnosis:  cervicalgia (M54.2); postural kyphosis (M40.03)  Medical/Referring Diagnosis:  Cervicalgia [M54.2]  Referring Physician:  Fortino Lainez MD MD Orders:  PT Eval and Treat   Date of Onset:  No data recorded   Allergies:  Mirabegron, Propoxyphene, and Sulfa antibiotics  Restrictions/Precautions:    Restrictions/Precautions: None  No data recorded   Interventions Planned (Treatment may consist of any combination of the following):    No data recorded   Subjective [id number]: Patient reports she is doing well with progressing in the prep program. Still has some proximal L upper trap soreness, but overall, very pleased with her progress. Initial:   Neck 1/10 Post Session:   Neck 010  Medications Last Reviewed:  2022  Updated Objective Findings:  None Today  Treatment   THERAPEUTIC EXERCISE: (10 minutes):    Exercises per grid below to improve mobility and strength. Required minimal visual and verbal cues to promote proper body alignment, promote proper body posture and promote proper body mechanics. Progressed complexity of movement as indicated. Date:  22 Date:  22 Date:  22   Activity/Exercise Parameters Parameters Parameters   UBE   7 minutes  Level 1 Not today    Upper trap stretch    3x30 seconds     Rows   Blue band  3x10  Green band demonstration out in gym   B/L shoulder extension   Blue band  2x10 level, 2x10 elevated anchor in wall                         MANUAL THERAPY: (15 minutes):    Soft tissue mobilization was utilized and necessary because of the patient's restricted joint motion and restricted motion of soft tissue. Patient received STM to B/L upper trap and cervical paraspinals in sitting/supine        Treatment/Session Summary:    · Treatment Assessment: Patient is doing quite well with transition to independent rehab. Will follow-up 1-2 more times, then expect to discharge from formal PT.      · Communication/Consultation:  Discussed transition to PREP program when patient feels ready  · Equipment provided today:  None  · Recommendations/Intent for next treatment session: Next visit will focus on discharge review of HEP.     Total Treatment Billable Duration:  25 minutes - 15 minutes manual therapy, 10 minutes therapeutic exercise   Time In: 1100  Time Out: 1130     Joe Wilkinson, PT       Post Session Pain  Charge Capture  [employer] Portal  MD Guidelines  Scanned Media  Benefits  MyChart

## 2022-07-20 ENCOUNTER — OFFICE VISIT (OUTPATIENT)
Dept: ENT CLINIC | Age: 72
End: 2022-07-20
Payer: MEDICARE

## 2022-07-20 DIAGNOSIS — H61.23 BILATERAL IMPACTED CERUMEN: Primary | ICD-10-CM

## 2022-07-20 PROCEDURE — 69210 REMOVE IMPACTED EAR WAX UNI: CPT | Performed by: OTOLARYNGOLOGY

## 2022-07-20 RX ORDER — PRAVASTATIN SODIUM 20 MG
TABLET ORAL
COMMUNITY
Start: 2022-07-13

## 2022-07-20 ASSESSMENT — ENCOUNTER SYMPTOMS
ABDOMINAL PAIN: 0
SHORTNESS OF BREATH: 0

## 2022-07-20 NOTE — PROGRESS NOTES
Chief Complaint   Patient presents with    Cerumen Impaction     Patient is here for her 6 month ear cleaning. HPI:  Mery Gifford is a 66 y.o. female seen in follow-up for routine 6 mo ear cleaning- last seen on 1/9/22. Her ears feel pretty good today- no major fulness or muffled hearing. She has been dealing w/ some neck spasms lately and has been dx w/ DDD. Past Medical History, Past Surgical History, Family history, Social History, and Medications were all reviewed with the patient today and updated as necessary. Allergies   Allergen Reactions    Mirabegron Other (See Comments)    Propoxyphene Nausea Only and Rash    Sulfa Antibiotics Rash     Patient Active Problem List   Diagnosis    Vitamin D deficiency    Hypothyroidism     Current Outpatient Medications   Medication Sig    pravastatin (PRAVACHOL) 20 MG tablet     ASPIRIN 81 PO Take by mouth    Biotin 10 MG tablet Take by mouth    calcium citrate-vitamin D (CITRICAL + D) 315-250 MG-UNIT TABS per tablet Take by mouth 2 times daily    Cholecalciferol 50 MCG (2000 UT) TABS Take by mouth    docusate (COLACE, DULCOLAX) 100 MG CAPS Take 100 mg by mouth    ferrous gluconate (FERGON) 240 (27 Fe) MG tablet Take 240 mg by mouth 3 times daily (with meals)    levothyroxine (SYNTHROID) 50 MCG tablet TAKE ONE TABLET BY MOUTH EVERY DAY     No current facility-administered medications for this visit.      Past Medical History:   Diagnosis Date    Anemia     IBS (irritable bowel syndrome)     Mixed conductive and sensorineural hearing loss     Unspecified hypothyroidism 2/26/2013    Unspecified vitamin D deficiency      Social History     Tobacco Use    Smoking status: Never    Smokeless tobacco: Never   Substance Use Topics    Alcohol use: No     Past Surgical History:   Procedure Laterality Date    BREAST BIOPSY Left over 30 yrs ago    BREAST BIOPSY Right over 30 yrs ago    x 2    BREAST LUMPECTOMY      CATARACT REMOVAL Bilateral 2018    HEMORRHOID SURGERY  1968    HYSTERECTOMY, TOTAL ABDOMINAL (CERVIX REMOVED)  1986    endometriosis    OTHER SURGICAL HISTORY      breast cyst benign    OTHER SURGICAL HISTORY      c section    OTHER SURGICAL HISTORY      diverticuli removed    AL APPENDECTOMY       Family History   Problem Relation Age of Onset    Cancer Mother     Cancer Father     Heart Disease Maternal Grandfather     Heart Disease Paternal Grandfather     Cancer Maternal Grandmother     Cancer Paternal Grandmother     Breast Cancer Neg Hx         ROS:    Review of Systems   Constitutional:  Negative for fever. Eyes:  Negative for visual disturbance. Respiratory:  Negative for shortness of breath. Cardiovascular:  Negative for chest pain. Gastrointestinal:  Negative for abdominal pain. Skin:  Negative for rash. Neurological:  Negative for dizziness and headaches. Hematological:  Negative for adenopathy. Psychiatric/Behavioral:  Negative for agitation. PHYSICAL EXAM:    There were no vitals taken for this visit. General: NAD, well-appearing  Neuro: No gross neuro deficits. No facial weakness. Eyes: No periorbital edema/ecchymosis. No nystagmus. Skin: No facial erythema, rashes or concerning lesions. Nose: No external deviations or saddling. Intranasally, septum is midline without perforations, nasal mucosa appears healthy with no erythema, mucopurulence, or polyps. Mouth: Moist mucus membranes, normal tongue/palate mobility, no concerning mucosal lesions. Oropharynx clear with no erythema/exudate, no tonsillar hypertrophy. Ears: Normal appearing auricles, no hematomas. Narrowed EACs w/ bilateral cerumen impaction, healthy canal skin, TM's intact with no perforations or retraction pockets. No middle ear effusions. Neck: Soft, supple, no palpable neck masses. No palpable parotid or submandibular masses. No thyromegaly or palpable thyroid nodules. Lymphatics: No palpable cervical LAD. Resp: No audible stridor or wheezing.   CV: No murmurs, no JVD. Extremities: No clubbing or cyanosis. PROCEDURE: Cerumen removal under binocular microscopy  INDICATIONS: Cerumen impaction  DESCRIPTION: After verbal consent was obtained and a timeout was performed, the otologic microscope was used to visualize both ears. There were normal appearing auricles bilaterally. There was cerumen impaction bilaterally. I cleaned out both ears under the scope w/ a right angle hook and otologic suctions. After cleaning, the ear canal skin was healthy and both TMs were intact w/ no perforations. Both middle ear spaces were clear w/ no effusions. The patient tolerated the procedure well and there were no complications. ASSESSMENT and PLAN      ICD-10-CM    1. Bilateral impacted cerumen  H61.23 REMOVE IMPACTED EAR WAX        she had cerumen impaction bilaterally and I cleaned out her ears today under the microscope. After cleaning, her ears were healthy on exam with no other pathology. RTC in 6 mo for routine ear cleaning.     Peri Galvan MD  7/20/2022    Electronically signed by Peri Galvan MD on 7/20/2022 at 10:39 AM

## 2022-09-15 ENCOUNTER — TRANSCRIBE ORDERS (OUTPATIENT)
Dept: SCHEDULING | Age: 72
End: 2022-09-15

## 2022-09-15 DIAGNOSIS — Z12.31 VISIT FOR SCREENING MAMMOGRAM: Primary | ICD-10-CM

## 2022-10-11 ENCOUNTER — HOSPITAL ENCOUNTER (OUTPATIENT)
Dept: MAMMOGRAPHY | Age: 72
Discharge: HOME | End: 2022-10-14
Payer: MEDICARE

## 2022-10-11 DIAGNOSIS — Z12.31 VISIT FOR SCREENING MAMMOGRAM: ICD-10-CM

## 2022-10-11 PROCEDURE — 77063 BREAST TOMOSYNTHESIS BI: CPT

## 2023-01-20 ENCOUNTER — OFFICE VISIT (OUTPATIENT)
Dept: ENT CLINIC | Age: 73
End: 2023-01-20

## 2023-01-20 VITALS — WEIGHT: 164 LBS | BODY MASS INDEX: 29.06 KG/M2 | HEIGHT: 63 IN

## 2023-01-20 DIAGNOSIS — H61.23 BILATERAL IMPACTED CERUMEN: Primary | ICD-10-CM

## 2023-01-20 ASSESSMENT — ENCOUNTER SYMPTOMS
ABDOMINAL PAIN: 0
SHORTNESS OF BREATH: 0

## 2023-01-20 NOTE — PROGRESS NOTES
No chief complaint on file. HPI:  Han Thompson is a 74 y.o. female seen in follow-up for routine 6 mo ear cleaning- last seen on 7/20/22. Her ears feel pretty good today- no major fulness or muffled hearing. She denies any otalgia or otorrhea. No other new medical problems. Past Medical History, Past Surgical History, Family history, Social History, and Medications were all reviewed with the patient today and updated as necessary. Allergies   Allergen Reactions    Aspirin      Other reaction(s): Blood count problem-Allergy    Azithromycin     Ibuprofen     Mirabegron Other (See Comments)    Propoxyphene Nausea Only and Rash    Sulfa Antibiotics Rash     Patient Active Problem List   Diagnosis    Vitamin D deficiency    Hypothyroidism     Current Outpatient Medications   Medication Sig    pravastatin (PRAVACHOL) 20 MG tablet     ASPIRIN 81 PO Take by mouth    Biotin 10 MG tablet Take by mouth    calcium citrate-vitamin D (CITRICAL + D) 315-250 MG-UNIT TABS per tablet Take by mouth 2 times daily    Cholecalciferol 50 MCG (2000 UT) TABS Take by mouth    docusate (COLACE, DULCOLAX) 100 MG CAPS Take 100 mg by mouth    ferrous gluconate (FERGON) 240 (27 Fe) MG tablet Take 240 mg by mouth 3 times daily (with meals)    levothyroxine (SYNTHROID) 50 MCG tablet TAKE ONE TABLET BY MOUTH EVERY DAY     No current facility-administered medications for this visit.      Past Medical History:   Diagnosis Date    Anemia     IBS (irritable bowel syndrome)     Mixed conductive and sensorineural hearing loss     Unspecified hypothyroidism 2/26/2013    Unspecified vitamin D deficiency      Social History     Tobacco Use    Smoking status: Never    Smokeless tobacco: Never   Substance Use Topics    Alcohol use: No     Past Surgical History:   Procedure Laterality Date    BREAST BIOPSY Left over 30 yrs ago    BREAST BIOPSY Right over 30 yrs ago    x 2    BREAST LUMPECTOMY      CATARACT REMOVAL Bilateral 2018 HEMORRHOID SURGERY  1968    HYSTERECTOMY, TOTAL ABDOMINAL (CERVIX REMOVED)  1986    endometriosis    OTHER SURGICAL HISTORY      breast cyst benign    OTHER SURGICAL HISTORY      c section    OTHER SURGICAL HISTORY      diverticuli removed    MT APPENDECTOMY       Family History   Problem Relation Age of Onset    Cancer Mother     Cancer Father     Heart Disease Maternal Grandfather     Heart Disease Paternal Grandfather     Cancer Maternal Grandmother     Cancer Paternal Grandmother     Breast Cancer Neg Hx         ROS:    Review of Systems   Constitutional:  Negative for fever. Eyes:  Negative for visual disturbance. Respiratory:  Negative for shortness of breath. Cardiovascular:  Negative for chest pain. Gastrointestinal:  Negative for abdominal pain. Skin:  Negative for rash. Neurological:  Negative for dizziness and headaches. Hematological:  Negative for adenopathy. Psychiatric/Behavioral:  Negative for agitation. PHYSICAL EXAM:    Ht 5' 3\" (1.6 m)   Wt 164 lb (74.4 kg)   BMI 29.05 kg/m²     General: NAD, well-appearing  Neuro: No gross neuro deficits. No facial weakness. Eyes: No periorbital edema/ecchymosis. No nystagmus. Skin: No facial erythema, rashes or concerning lesions. Nose: No external deviations or saddling. Intranasally, septum is midline without perforations, nasal mucosa appears healthy with no erythema, mucopurulence, or polyps. Mouth: Moist mucus membranes, normal tongue/palate mobility, no concerning mucosal lesions. Oropharynx clear with no erythema/exudate, no tonsillar hypertrophy. Ears: Normal appearing auricles, no hematomas. Narrowed EACs clear w/ bilateral cerumen impaction, healthy canal skin, TM's intact with no perforations or retraction pockets. No middle ear effusions. Neck: Soft, supple, no palpable neck masses. No palpable parotid or submandibular masses. No thyromegaly or palpable thyroid nodules. Lymphatics: No palpable cervical LAD.   Resp: No audible stridor or wheezing. CV: No murmurs, no JVD. Extremities: No clubbing or cyanosis. PROCEDURE: Cerumen removal under binocular microscopy  INDICATIONS: Cerumen impaction  DESCRIPTION: After verbal consent was obtained and a timeout was performed, the otologic microscope was used to visualize both ears. There were normal appearing auricles bilaterally. There was cerumen impaction bilaterally. I cleaned out both ears under the scope w/ a right angle hook and otologic suctions. After cleaning, the ear canal skin was healthy and both TMs were intact w/ no perforations. Both middle ear spaces were clear w/ no effusions. The patient tolerated the procedure well and there were no complications. ASSESSMENT and PLAN      ICD-10-CM    1. Bilateral impacted cerumen  H61.23 32513 - CO REMOVE IMPACTED EAR WAX        she had cerumen impaction in her her narrowed EAC's bilaterally and I cleaned out her ears today under the microscope. After cleaning, her ears were healthy on exam with no other pathology. RTC in 6 mo for routine ear cleaning.     Cass Warner MD  1/20/2023    Electronically signed by Cass Warner MD on 1/20/2023 at 10:48 AM

## 2023-05-24 ENCOUNTER — HOSPITAL ENCOUNTER (OUTPATIENT)
Dept: MAMMOGRAPHY | Age: 73
Discharge: HOME | End: 2023-05-27
Payer: MEDICARE

## 2023-05-24 DIAGNOSIS — M85.80 OTHER SPECIFIED DISORDERS OF BONE DENSITY AND STRUCTURE, UNSPECIFIED SITE: ICD-10-CM

## 2023-05-24 DIAGNOSIS — Z78.0 MENOPAUSE: ICD-10-CM

## 2023-05-24 PROCEDURE — 77080 DXA BONE DENSITY AXIAL: CPT

## 2023-07-24 ENCOUNTER — OFFICE VISIT (OUTPATIENT)
Dept: ENT CLINIC | Age: 73
End: 2023-07-24
Payer: MEDICARE

## 2023-07-24 VITALS
HEIGHT: 63 IN | BODY MASS INDEX: 27.46 KG/M2 | DIASTOLIC BLOOD PRESSURE: 76 MMHG | SYSTOLIC BLOOD PRESSURE: 128 MMHG | WEIGHT: 155 LBS

## 2023-07-24 DIAGNOSIS — H61.23 BILATERAL IMPACTED CERUMEN: Primary | ICD-10-CM

## 2023-07-24 PROCEDURE — 69210 REMOVE IMPACTED EAR WAX UNI: CPT | Performed by: OTOLARYNGOLOGY

## 2023-07-24 ASSESSMENT — ENCOUNTER SYMPTOMS
SORE THROAT: 0
SHORTNESS OF BREATH: 0
ABDOMINAL PAIN: 0

## 2023-10-16 ENCOUNTER — HOSPITAL ENCOUNTER (OUTPATIENT)
Dept: MAMMOGRAPHY | Age: 73
Discharge: HOME | End: 2023-10-19
Payer: MEDICARE

## 2023-10-16 VITALS — HEIGHT: 63 IN | BODY MASS INDEX: 27.82 KG/M2 | WEIGHT: 157 LBS

## 2023-10-16 DIAGNOSIS — Z12.31 ENCOUNTER FOR SCREENING MAMMOGRAM FOR BREAST CANCER: ICD-10-CM

## 2023-10-16 PROCEDURE — 77063 BREAST TOMOSYNTHESIS BI: CPT

## 2024-01-26 ENCOUNTER — OFFICE VISIT (OUTPATIENT)
Dept: ENT CLINIC | Age: 74
End: 2024-01-26

## 2024-01-26 VITALS — WEIGHT: 164 LBS | HEIGHT: 63 IN | RESPIRATION RATE: 17 BRPM | BODY MASS INDEX: 29.06 KG/M2

## 2024-01-26 DIAGNOSIS — H61.23 BILATERAL IMPACTED CERUMEN: Primary | ICD-10-CM

## 2024-01-26 ASSESSMENT — ENCOUNTER SYMPTOMS
SHORTNESS OF BREATH: 0
SORE THROAT: 0
ABDOMINAL PAIN: 0

## 2024-01-26 NOTE — PROGRESS NOTES
with no perforations or retraction pockets. No middle ear effusions.  Neck: Soft, supple, no palpable neck masses. No palpable parotid or submandibular masses. No thyromegaly or palpable thyroid nodules.   Lymphatics: No palpable cervical LAD.  Resp: No audible stridor or wheezing.  CV: No murmurs, no JVD.  Extremities: No clubbing or cyanosis.    PROCEDURE: Cerumen removal under binocular microscopy  INDICATIONS: Cerumen impaction  DESCRIPTION: After verbal consent was obtained and a timeout was performed, the otologic microscope was used to visualize both ears. There were normal appearing auricles bilaterally. There was cerumen impaction bilaterally. I cleaned out both ears under the scope w/ a right angle hook and otologic suctions. After cleaning, the ear canal skin was healthy and both TMs were intact w/ no perforations. Both middle ear spaces were clear w/ no effusions. The patient tolerated the procedure well and there were no complications.      ASSESSMENT and PLAN      ICD-10-CM    1. Bilateral impacted cerumen  H61.23 [id number] - TX REMOVE IMPACTED EAR WAX          She had bilateral cerumen impaction and I cleaned out her ears today under the microscope.  RTC in 6 months for another ear cleaning.    Eduard Herrera MD  1/26/2024    Electronically signed by Eduard Herrera MD on 1/26/2024 at 9:23 AM

## 2024-07-26 ENCOUNTER — OFFICE VISIT (OUTPATIENT)
Dept: ENT CLINIC | Age: 74
End: 2024-07-26
Payer: COMMERCIAL

## 2024-07-26 VITALS — RESPIRATION RATE: 14 BRPM | BODY MASS INDEX: 29.27 KG/M2 | HEIGHT: 63 IN | WEIGHT: 165.2 LBS

## 2024-07-26 DIAGNOSIS — H61.23 BILATERAL IMPACTED CERUMEN: Primary | ICD-10-CM

## 2024-07-26 PROCEDURE — 69210 REMOVE IMPACTED EAR WAX UNI: CPT | Performed by: OTOLARYNGOLOGY

## 2024-07-26 ASSESSMENT — ENCOUNTER SYMPTOMS
RESPIRATORY NEGATIVE: 1
GASTROINTESTINAL NEGATIVE: 1
ALLERGIC/IMMUNOLOGIC NEGATIVE: 1
EYES NEGATIVE: 1

## 2024-07-26 NOTE — PROGRESS NOTES
Chief Complaint   Patient presents with    Follow-up    Cerumen Impaction       HPI:  Nunu Recinos is a 73 y.o. female seen in follow-up for routine 6 mo ear cleaning- last seen on 7/24/23.  She has very narrowed EACs bilaterally.  Doing well overall since her last visit on 1/26/2024.  She denies any change in hearing, and there is not been any otalgia or otorrhea.  No other new complaints.    Past Medical History, Past Surgical History, Family history, Social History, and Medications were all reviewed with the patient today and updated as necessary.     Allergies   Allergen Reactions    Latex     Acetaminophen     Aspirin      Other reaction(s): Blood count problem-Allergy    Azithromycin     Cephalexin     Chocolate Flavor     Ibuprofen     Methadone     Mirabegron Other (See Comments)    Penicillins     Pentazocine     Propoxyphene Nausea Only and Rash    Sulfa Antibiotics Rash     Patient Active Problem List   Diagnosis    Vitamin D deficiency    Hypothyroidism     Current Outpatient Medications   Medication Sig    pravastatin (PRAVACHOL) 20 MG tablet     ASPIRIN 81 PO Take by mouth    Biotin 10 MG tablet Take by mouth    calcium citrate-vitamin D (CITRICAL + D) 315-250 MG-UNIT TABS per tablet Take by mouth 2 times daily    Cholecalciferol 50 MCG (2000 UT) TABS Take by mouth    docusate (COLACE, DULCOLAX) 100 MG CAPS Take 100 mg by mouth    ferrous gluconate (FERGON) 240 (27 Fe) MG tablet Take 1 tablet by mouth 3 times daily (with meals)    levothyroxine (SYNTHROID) 50 MCG tablet TAKE ONE TABLET BY MOUTH EVERY DAY     No current facility-administered medications for this visit.     Past Medical History:   Diagnosis Date    Anemia     IBS (irritable bowel syndrome)     Mixed conductive and sensorineural hearing loss     Unspecified hypothyroidism 2/26/2013    Unspecified vitamin D deficiency      Social History     Tobacco Use    Smoking status: Never    Smokeless tobacco: Never   Substance Use Topics

## 2024-10-22 ENCOUNTER — HOSPITAL ENCOUNTER (OUTPATIENT)
Dept: MAMMOGRAPHY | Age: 74
Discharge: HOME | End: 2024-10-25
Payer: COMMERCIAL

## 2024-10-22 VITALS — HEIGHT: 63 IN | BODY MASS INDEX: 28.35 KG/M2 | WEIGHT: 160 LBS

## 2024-10-22 DIAGNOSIS — Z12.31 VISIT FOR SCREENING MAMMOGRAM: ICD-10-CM

## 2024-10-22 PROCEDURE — 77063 BREAST TOMOSYNTHESIS BI: CPT

## 2025-01-27 ENCOUNTER — OFFICE VISIT (OUTPATIENT)
Dept: ENT CLINIC | Age: 75
End: 2025-01-27
Payer: COMMERCIAL

## 2025-01-27 VITALS — RESPIRATION RATE: 10 BRPM | WEIGHT: 170.8 LBS | HEIGHT: 63 IN | BODY MASS INDEX: 30.26 KG/M2

## 2025-01-27 DIAGNOSIS — H61.23 BILATERAL IMPACTED CERUMEN: Primary | ICD-10-CM

## 2025-01-27 PROCEDURE — 69210 REMOVE IMPACTED EAR WAX UNI: CPT | Performed by: OTOLARYNGOLOGY

## 2025-01-27 ASSESSMENT — ENCOUNTER SYMPTOMS
RESPIRATORY NEGATIVE: 1
EYES NEGATIVE: 1
GASTROINTESTINAL NEGATIVE: 1
ALLERGIC/IMMUNOLOGIC NEGATIVE: 1

## 2025-01-27 NOTE — PROGRESS NOTES
Chief Complaint   Patient presents with    Follow-up     6 mos ear cleaning        HPI:  Nunu Recinos is a 74 y.o. female seen in follow-up for her ears.  She has a long history of cerumen impaction and has very narrowed EACs bilaterally and requires ear cleanings every 6 months.  Last seen back on 7/26/24.  Doing well since then, but both ears do feel stopped up today.  There has not been any otalgia or otorrhea.  No other new ENT complaints.    Past Medical History, Past Surgical History, Family history, Social History, and Medications were all reviewed with the patient today and updated as necessary.     Allergies   Allergen Reactions    Latex     Acetaminophen     Aspirin      Other reaction(s): Blood count problem-Allergy    Azithromycin     Cephalexin     Chocolate Flavoring Agent (Non-Screening)     Ibuprofen     Methadone     Mirabegron Other (See Comments)    Penicillins     Pentazocine     Propoxyphene Nausea Only and Rash    Sulfa Antibiotics Rash     Patient Active Problem List   Diagnosis    Vitamin D deficiency    Hypothyroidism     Current Outpatient Medications   Medication Sig    pravastatin (PRAVACHOL) 20 MG tablet     ASPIRIN 81 PO Take by mouth    Biotin 10 MG tablet Take by mouth    calcium citrate-vitamin D (CITRICAL + D) 315-250 MG-UNIT TABS per tablet Take by mouth 2 times daily    Cholecalciferol 50 MCG (2000 UT) TABS Take by mouth    docusate (COLACE, DULCOLAX) 100 MG CAPS Take 100 mg by mouth    ferrous gluconate (FERGON) 240 (27 Fe) MG tablet Take 1 tablet by mouth 3 times daily (with meals)    levothyroxine (SYNTHROID) 50 MCG tablet TAKE ONE TABLET BY MOUTH EVERY DAY     No current facility-administered medications for this visit.     Past Medical History:   Diagnosis Date    Anemia     IBS (irritable bowel syndrome)     Mixed conductive and sensorineural hearing loss     Unspecified hypothyroidism 2/26/2013    Unspecified vitamin D deficiency      Social History     Tobacco

## 2025-02-12 ENCOUNTER — APPOINTMENT (OUTPATIENT)
Dept: URBAN - METROPOLITAN AREA CLINIC 330 | Facility: CLINIC | Age: 75
Setting detail: DERMATOLOGY
End: 2025-02-12

## 2025-02-12 DIAGNOSIS — D18.0 HEMANGIOMA: ICD-10-CM

## 2025-02-12 DIAGNOSIS — D17 BENIGN LIPOMATOUS NEOPLASM: ICD-10-CM

## 2025-02-12 DIAGNOSIS — D22 MELANOCYTIC NEVI: ICD-10-CM

## 2025-02-12 DIAGNOSIS — L91.8 OTHER HYPERTROPHIC DISORDERS OF THE SKIN: ICD-10-CM

## 2025-02-12 DIAGNOSIS — L82.1 OTHER SEBORRHEIC KERATOSIS: ICD-10-CM

## 2025-02-12 DIAGNOSIS — L81.4 OTHER MELANIN HYPERPIGMENTATION: ICD-10-CM

## 2025-02-12 PROBLEM — D48.5 NEOPLASM OF UNCERTAIN BEHAVIOR OF SKIN: Status: ACTIVE | Noted: 2025-02-12

## 2025-02-12 PROBLEM — D17.23 BENIGN LIPOMATOUS NEOPLASM OF SKIN AND SUBCUTANEOUS TISSUE OF RIGHT LEG: Status: ACTIVE | Noted: 2025-02-12

## 2025-02-12 PROBLEM — D22.5 MELANOCYTIC NEVI OF TRUNK: Status: ACTIVE | Noted: 2025-02-12

## 2025-02-12 PROBLEM — D23.71 OTHER BENIGN NEOPLASM OF SKIN OF RIGHT LOWER LIMB, INCLUDING HIP: Status: ACTIVE | Noted: 2025-02-12

## 2025-02-12 PROBLEM — D18.01 HEMANGIOMA OF SKIN AND SUBCUTANEOUS TISSUE: Status: ACTIVE | Noted: 2025-02-12

## 2025-02-12 PROCEDURE — ? COUNSELING

## 2025-02-12 PROCEDURE — ? SHAVE REMOVAL

## 2025-02-12 PROCEDURE — ? BENIGN DESTRUCTION COSMETIC

## 2025-02-12 PROCEDURE — 11310 SHAVE SKIN LESION 0.5 CM/<: CPT

## 2025-02-12 PROCEDURE — 99203 OFFICE O/P NEW LOW 30 MIN: CPT | Mod: 25

## 2025-02-12 PROCEDURE — ? TREATMENT REGIMEN

## 2025-02-12 ASSESSMENT — LOCATION ZONE DERM
LOCATION ZONE: NECK
LOCATION ZONE: LEG
LOCATION ZONE: FEET
LOCATION ZONE: FACE
LOCATION ZONE: TRUNK
LOCATION ZONE: ARM

## 2025-02-12 ASSESSMENT — LOCATION SIMPLE DESCRIPTION DERM
LOCATION SIMPLE: RIGHT CHEEK
LOCATION SIMPLE: RIGHT FOREARM
LOCATION SIMPLE: RIGHT TEMPLE
LOCATION SIMPLE: LEFT PRETIBIAL REGION
LOCATION SIMPLE: UPPER BACK
LOCATION SIMPLE: LEFT ANTERIOR NECK
LOCATION SIMPLE: RIGHT FOOT
LOCATION SIMPLE: LEFT FOREARM
LOCATION SIMPLE: LEFT CHEEK

## 2025-02-12 ASSESSMENT — LOCATION DETAILED DESCRIPTION DERM
LOCATION DETAILED: RIGHT DORSAL FOOT
LOCATION DETAILED: LEFT PROXIMAL PRETIBIAL REGION
LOCATION DETAILED: RIGHT PROXIMAL DORSAL FOREARM
LOCATION DETAILED: RIGHT CENTRAL TEMPLE
LOCATION DETAILED: LEFT PROXIMAL DORSAL FOREARM
LOCATION DETAILED: LEFT DISTAL DORSAL FOREARM
LOCATION DETAILED: LEFT INFERIOR LATERAL NECK
LOCATION DETAILED: LEFT INFERIOR CENTRAL MALAR CHEEK
LOCATION DETAILED: INFERIOR THORACIC SPINE
LOCATION DETAILED: RIGHT MEDIAL MALAR CHEEK
LOCATION DETAILED: RIGHT DISTAL DORSAL FOREARM

## 2025-02-12 NOTE — HPI: EVALUATION OF SKIN LESION(S)
What Type Of Note Output Would You Prefer (Optional)?: Bullet Format
Hpi Title: Evaluation of Skin Lesions
Additional History: Pt complains of spot on face

## 2025-02-12 NOTE — PROCEDURE: MIPS QUALITY
Detail Level: Detailed
Quality 130: Documentation Of Current Medications In The Medical Record: Current Medications Documented
Quality 226: Preventive Care And Screening: Tobacco Use: Screening And Cessation Intervention: Patient screened for tobacco use and is an ex/non-smoker
Quality 47: Advance Care Plan: Advance care planning not documented, reason not otherwise specified.
Quality 431: Preventive Care And Screening: Unhealthy Alcohol Use - Screening: Patient not identified as an unhealthy alcohol user when screened for unhealthy alcohol use using a systematic screening method
Quality 110: Preventive Care And Screening: Influenza Immunization: Influenza Immunization Administered during Influenza season
Quality 402: Tobacco Use And Help With Quitting Among Adolescents: Patient screened for tobacco and never smoked

## 2025-05-06 ENCOUNTER — HOSPITAL ENCOUNTER (OUTPATIENT)
Dept: PHYSICAL THERAPY | Age: 75
Setting detail: RECURRING SERIES
Discharge: HOME | End: 2025-05-09
Payer: COMMERCIAL

## 2025-05-06 DIAGNOSIS — R26.2 DIFFICULTY IN WALKING, NOT ELSEWHERE CLASSIFIED: ICD-10-CM

## 2025-05-06 DIAGNOSIS — M25.661 STIFFNESS OF RIGHT KNEE, NOT ELSEWHERE CLASSIFIED: ICD-10-CM

## 2025-05-06 DIAGNOSIS — M25.561 CHRONIC PAIN OF RIGHT KNEE: Primary | ICD-10-CM

## 2025-05-06 DIAGNOSIS — G89.29 CHRONIC PAIN OF RIGHT KNEE: Primary | ICD-10-CM

## 2025-05-06 PROCEDURE — 97162 PT EVAL MOD COMPLEX 30 MIN: CPT

## 2025-05-06 PROCEDURE — 97110 THERAPEUTIC EXERCISES: CPT

## 2025-05-06 NOTE — PROGRESS NOTES
Nunu Recinos  : 1950  Primary: [employer] (Medicare Managed)  Secondary:  [address] [address]  Phone: [phone]  Fax: [phone] Plan Frequency: 2/week x 8 weeks    Plan of Care/Certification Expiration Date: 25        Plan of Care/Certification Expiration Date:  Plan of Care/Certification Expiration Date: 25    Frequency/Duration: Plan Frequency: 2/week x 8 weeks      Time In/Out:   Time In: 1105  Time Out: 1145      PT Visit Info:    Total # of Visits to Date: 1      Visit Count:  1    OUTPATIENT PHYSICAL THERAPY:   Treatment Note 2025       Episode  (R knee pain)               Treatment Diagnosis:    Chronic pain of right knee  Stiffness of right knee, not elsewhere classified  Difficulty in walking, not elsewhere classified  Medical/Referring Diagnosis:    Pain in right knee  Other chronic pain  Unilateral primary osteoarthritis, right knee      Referring Physician:  Ninoska Ray PA MD Orders:  PT Eval and Treat   Return MD Appt:     Date of Onset:     Allergies:   Latex, Acetaminophen, Aspirin, Azithromycin, Cephalexin, Chocolate flavoring agent (non-screening), Ibuprofen, Methadone, Mirabegron, Penicillins, Pentazocine, Propoxyphene, and Sulfa antibiotics  Restrictions/Precautions:   None      Interventions Planned (Treatment may consist of any combination of the following):  Current Treatment Recommendations: Strengthening; ROM; Manual; Home exercise program    See Assessment Note    Subjective Comments:   See eval  Initial Pain Level:   2   /10  Post Session Pain Level:    2   /10  Medications Last Reviewed: 2025  Updated Objective Findings:  See Evaluation Note from today  Treatment   THERAPEUTIC EXERCISE: (15 minutes):    Exercises per grid below to improve mobility and strength.  Required minimal visual and verbal cues to promote proper body alignment and promote

## 2025-05-12 ENCOUNTER — HOSPITAL ENCOUNTER (OUTPATIENT)
Dept: PHYSICAL THERAPY | Age: 75
Setting detail: RECURRING SERIES
Discharge: HOME | End: 2025-05-15
Payer: COMMERCIAL

## 2025-05-12 PROCEDURE — 97110 THERAPEUTIC EXERCISES: CPT

## 2025-05-12 NOTE — PROGRESS NOTES
Nunu Recinos  : 1950  Primary: [employer] (Medicare Managed)  Secondary:  [address] [address]  Phone: [phone]  Fax: [phone] Plan Frequency: 2/week x 8 weeks    Plan of Care/Certification Expiration Date: 25        Plan of Care/Certification Expiration Date:  Plan of Care/Certification Expiration Date: 25    Frequency/Duration: Plan Frequency: 2/week x 8 weeks      Time In/Out:   Time In: 1245  Time Out: 0130      PT Visit Info:    Total # of Visits to Date: 2      Visit Count:  2    OUTPATIENT PHYSICAL THERAPY:   Treatment Note 2025       Episode  (R knee pain)               Treatment Diagnosis:    Chronic pain of right knee  Stiffness of right knee, not elsewhere classified  Difficulty in walking, not elsewhere classified  Medical/Referring Diagnosis:    Pain in right knee  Other chronic pain  Unilateral primary osteoarthritis, right knee      Referring Physician:  Ninoska Ray PA MD Orders:  PT Eval and Treat   Return MD Appt:     Date of Onset:     Allergies:   Latex, Acetaminophen, Aspirin, Azithromycin, Cephalexin, Chocolate flavoring agent (non-screening), Ibuprofen, Methadone, Mirabegron, Penicillins, Pentazocine, Propoxyphene, and Sulfa antibiotics  Restrictions/Precautions:   None      Interventions Planned (Treatment may consist of any combination of the following):  Current Treatment Recommendations: Strengthening; ROM; Manual; Home exercise program    See Assessment Note    Subjective Comments:   My knee is better- I can lift it to get it in the car.  Initial Pain Level:   2   /10  Post Session Pain Level:    2   /10  Medications Last Reviewed: 2025  Updated Objective Findings:  None Today  Treatment   THERAPEUTIC EXERCISE: (15 minutes):    Exercises per grid below to improve mobility and strength.  Required minimal visual and verbal cues to promote proper body

## 2025-05-14 ENCOUNTER — HOSPITAL ENCOUNTER (OUTPATIENT)
Dept: PHYSICAL THERAPY | Age: 75
Setting detail: RECURRING SERIES
Discharge: HOME | End: 2025-05-17
Payer: COMMERCIAL

## 2025-05-14 PROCEDURE — 97110 THERAPEUTIC EXERCISES: CPT

## 2025-05-14 NOTE — PROGRESS NOTES
Nunu Recinos  : 1950  Primary: [employer] (Medicare Managed)  Secondary:  [address] [address]  Phone: [phone]  Fax: [phone] Plan Frequency: 2/week x 8 weeks    Plan of Care/Certification Expiration Date: 25        Plan of Care/Certification Expiration Date:  Plan of Care/Certification Expiration Date: 25    Frequency/Duration: Plan Frequency: 2/week x 8 weeks      Time In/Out:   Time In: 1245  Time Out: 0130      PT Visit Info:    Total # of Visits to Date: 3      Visit Count:  3    OUTPATIENT PHYSICAL THERAPY:   Treatment Note 2025       Episode  (R knee pain)               Treatment Diagnosis:    Chronic pain of right knee  Stiffness of right knee, not elsewhere classified  Difficulty in walking, not elsewhere classified  Medical/Referring Diagnosis:    Pain in right knee  Other chronic pain  Unilateral primary osteoarthritis, right knee      Referring Physician:  Ninoska Ray PA MD Orders:  PT Eval and Treat   Return MD Appt:     Date of Onset:     Allergies:   Latex, Acetaminophen, Aspirin, Azithromycin, Cephalexin, Chocolate flavoring agent (non-screening), Ibuprofen, Methadone, Mirabegron, Penicillins, Pentazocine, Propoxyphene, and Sulfa antibiotics  Restrictions/Precautions:   None      Interventions Planned (Treatment may consist of any combination of the following):  Current Treatment Recommendations: Strengthening; ROM; Manual; Home exercise program    See Assessment Note    Subjective Comments:   My knee doesn't hurt but behind it does  Initial Pain Level:   3   /10  Post Session Pain Level:    0   /10  Medications Last Reviewed: 2025  Updated Objective Findings:  None Today  Treatment   THERAPEUTIC EXERCISE: (15 minutes):    Exercises per grid below to improve mobility and strength.  Required minimal visual and verbal cues to promote proper body alignment and

## 2025-05-19 ENCOUNTER — HOSPITAL ENCOUNTER (OUTPATIENT)
Dept: PHYSICAL THERAPY | Age: 75
Setting detail: RECURRING SERIES
Discharge: HOME | End: 2025-05-22
Payer: COMMERCIAL

## 2025-05-19 PROCEDURE — 97110 THERAPEUTIC EXERCISES: CPT

## 2025-05-19 NOTE — PROGRESS NOTES
Nunu Recinos  : 1950  Primary: [employer] (Medicare Managed)  Secondary:  [address] [address]  Phone: [phone]  Fax: [phone] Plan Frequency: 2/week x 8 weeks    Plan of Care/Certification Expiration Date: 25        Plan of Care/Certification Expiration Date:  Plan of Care/Certification Expiration Date: 25    Frequency/Duration: Plan Frequency: 2/week x 8 weeks      Time In/Out:   Time In: 1245  Time Out: 0130      PT Visit Info:    Total # of Visits to Date: 4      Visit Count:  4    OUTPATIENT PHYSICAL THERAPY:   Treatment Note 2025       Episode  (R knee pain)               Treatment Diagnosis:    Chronic pain of right knee  Stiffness of right knee, not elsewhere classified  Difficulty in walking, not elsewhere classified  Medical/Referring Diagnosis:    Pain in right knee  Other chronic pain  Unilateral primary osteoarthritis, right knee      Referring Physician:  Ninoska Ray PA MD Orders:  PT Eval and Treat   Return MD Appt:     Date of Onset:     Allergies:   Latex, Acetaminophen, Aspirin, Azithromycin, Cephalexin, Chocolate flavoring agent (non-screening), Ibuprofen, Methadone, Mirabegron, Penicillins, Pentazocine, Propoxyphene, and Sulfa antibiotics  Restrictions/Precautions:   None      Interventions Planned (Treatment may consist of any combination of the following):  Current Treatment Recommendations: Strengthening; ROM; Manual; Home exercise program    See Assessment Note    Subjective Comments:   I was doing pretty well until Saturday.  I had a very bad day Saturday.  My knee hurt and was swollen.  I do not think I did anything different.  Initial Pain Level:   2   /10  Post Session Pain Level:    0   /10  Medications Last Reviewed: 2025  Updated Objective Findings:  None Today  Treatment   THERAPEUTIC EXERCISE: (45 minutes):    Exercises per grid below to

## 2025-05-21 ENCOUNTER — HOSPITAL ENCOUNTER (OUTPATIENT)
Dept: PHYSICAL THERAPY | Age: 75
Setting detail: RECURRING SERIES
Discharge: HOME | End: 2025-05-24
Payer: COMMERCIAL

## 2025-05-21 PROCEDURE — 97110 THERAPEUTIC EXERCISES: CPT

## 2025-05-21 NOTE — PROGRESS NOTES
Nunu Recinos  : 1950  Primary: [employer] (Medicare Managed)  Secondary:  [address] [address]  Phone: [phone]  Fax: [phone] Plan Frequency: 2/week x 8 weeks    Plan of Care/Certification Expiration Date: 25        Plan of Care/Certification Expiration Date:  Plan of Care/Certification Expiration Date: 25    Frequency/Duration: Plan Frequency: 2/week x 8 weeks      Time In/Out:   Time In: 1245  Time Out: 0130      PT Visit Info:    Total # of Visits to Date: 5      Visit Count:  5    OUTPATIENT PHYSICAL THERAPY:   Treatment Note 2025       Episode  (R knee pain)               Treatment Diagnosis:    Chronic pain of right knee  Stiffness of right knee, not elsewhere classified  Difficulty in walking, not elsewhere classified  Medical/Referring Diagnosis:    Pain in right knee  Other chronic pain  Unilateral primary osteoarthritis, right knee      Referring Physician:  Ninoska Ray PA MD Orders:  PT Eval and Treat   Return MD Appt:     Date of Onset:     Allergies:   Latex, Acetaminophen, Aspirin, Azithromycin, Cephalexin, Chocolate flavoring agent (non-screening), Ibuprofen, Methadone, Mirabegron, Penicillins, Pentazocine, Propoxyphene, and Sulfa antibiotics  Restrictions/Precautions:   None      Interventions Planned (Treatment may consist of any combination of the following):  Current Treatment Recommendations: Strengthening; ROM; Manual; Home exercise program    See Assessment Note    Subjective Comments:   I am doing well.  Not hurting.  Initial Pain Level:   0   /10  Post Session Pain Level:    0   /10  Medications Last Reviewed: 2025  Updated Objective Findings:  None Today  Treatment   THERAPEUTIC EXERCISE: (45 minutes):    Exercises per grid below to improve mobility and strength.  Required minimal visual and verbal cues to promote proper body alignment and promote

## 2025-05-27 ENCOUNTER — HOSPITAL ENCOUNTER (OUTPATIENT)
Dept: PHYSICAL THERAPY | Age: 75
Setting detail: RECURRING SERIES
Discharge: HOME | End: 2025-05-30
Payer: COMMERCIAL

## 2025-05-27 PROCEDURE — 97110 THERAPEUTIC EXERCISES: CPT

## 2025-05-27 NOTE — PROGRESS NOTES
Nunu Recinos  : 1950  Primary: [employer] (Medicare Managed)  Secondary:  [address] [address]  Phone: [phone]  Fax: [phone] Plan Frequency: 2/week x 8 weeks    Plan of Care/Certification Expiration Date: 25        Plan of Care/Certification Expiration Date:  Plan of Care/Certification Expiration Date: 25    Frequency/Duration: Plan Frequency: 2/week x 8 weeks      Time In/Out:   Time In: 1245  Time Out: 0130      PT Visit Info:    Total # of Visits to Date: 6      Visit Count:  6    OUTPATIENT PHYSICAL THERAPY:   Treatment Note 2025       Episode  (R knee pain)               Treatment Diagnosis:    Chronic pain of right knee  Stiffness of right knee, not elsewhere classified  Difficulty in walking, not elsewhere classified  Medical/Referring Diagnosis:    Pain in right knee  Other chronic pain  Unilateral primary osteoarthritis, right knee      Referring Physician:  Ninoska Ray PA MD Orders:  PT Eval and Treat   Return MD Appt:     Date of Onset:     Allergies:   Latex, Acetaminophen, Aspirin, Azithromycin, Cephalexin, Chocolate flavoring agent (non-screening), Ibuprofen, Methadone, Mirabegron, Penicillins, Pentazocine, Propoxyphene, and Sulfa antibiotics  Restrictions/Precautions:   None      Interventions Planned (Treatment may consist of any combination of the following):  Current Treatment Recommendations: Strengthening; ROM; Manual; Home exercise program    See Assessment Note    Subjective Comments:   I am doing well.  It felt like it wanted to hurt but I did some stretches and it was fine  Initial Pain Level:   0   /10  Post Session Pain Level:    0   /10  Medications Last Reviewed: 2025  Updated Objective Findings:  None Today  Treatment   THERAPEUTIC EXERCISE: (45 minutes):    Exercises per grid below to improve mobility and strength.  Required minimal visual and

## 2025-05-30 ENCOUNTER — HOSPITAL ENCOUNTER (OUTPATIENT)
Dept: PHYSICAL THERAPY | Age: 75
Setting detail: RECURRING SERIES
End: 2025-05-30
Payer: COMMERCIAL

## 2025-05-30 PROCEDURE — 97110 THERAPEUTIC EXERCISES: CPT

## 2025-05-30 NOTE — PROGRESS NOTES
Nunu Recinos  : 1950  Primary: [employer] (Medicare Managed)  Secondary:  [address] [address]  Phone: [phone]  Fax: [phone] Plan Frequency: 2/week x 8 weeks    Plan of Care/Certification Expiration Date: 25        Plan of Care/Certification Expiration Date:  Plan of Care/Certification Expiration Date: 25    Frequency/Duration: Plan Frequency: 2/week x 8 weeks      Time In/Out:   Time In: 1245  Time Out: 0130      PT Visit Info:    Total # of Visits to Date: 7      Visit Count:  7    OUTPATIENT PHYSICAL THERAPY:   Treatment Note 2025       Episode  (R knee pain)               Treatment Diagnosis:    Chronic pain of right knee  Stiffness of right knee, not elsewhere classified  Difficulty in walking, not elsewhere classified  Medical/Referring Diagnosis:    Pain in right knee  Other chronic pain  Unilateral primary osteoarthritis, right knee      Referring Physician:  Ninoska Ray PA MD Orders:  PT Eval and Treat   Return MD Appt:     Date of Onset:     Allergies:   Latex, Acetaminophen, Aspirin, Azithromycin, Cephalexin, Chocolate flavoring agent (non-screening), Ibuprofen, Methadone, Mirabegron, Penicillins, Pentazocine, Propoxyphene, and Sulfa antibiotics  Restrictions/Precautions:   None      Interventions Planned (Treatment may consist of any combination of the following):  Current Treatment Recommendations: Strengthening; ROM; Manual; Home exercise program    See Assessment Note    Subjective Comments:   I am doing well.  It felt like it wanted to hurt but I did some stretches and it was fine  Initial Pain Level:   0   /10  Post Session Pain Level:    0   /10  Medications Last Reviewed: 2025  Updated Objective Findings:  None Today  Treatment   THERAPEUTIC EXERCISE: (45 minutes):    Exercises per grid below to improve mobility and strength.  Required minimal visual and

## 2025-06-02 ENCOUNTER — HOSPITAL ENCOUNTER (OUTPATIENT)
Dept: PHYSICAL THERAPY | Age: 75
Setting detail: RECURRING SERIES
Discharge: HOME | End: 2025-06-05
Payer: COMMERCIAL

## 2025-06-02 PROCEDURE — 97110 THERAPEUTIC EXERCISES: CPT

## 2025-06-02 NOTE — PROGRESS NOTES
Nunu Recinos  : 1950  Primary: [employer] (Medicare Managed)  Secondary:  [address] [address]  Phone: [phone]  Fax: [phone] Plan Frequency: 2/week x 8 weeks    Plan of Care/Certification Expiration Date: 25        Plan of Care/Certification Expiration Date:  Plan of Care/Certification Expiration Date: 25    Frequency/Duration: Plan Frequency: 2/week x 8 weeks      Time In/Out:   Time In: 1245  Time Out: 0130      PT Visit Info:    Total # of Visits to Date: 8      Visit Count:  8    OUTPATIENT PHYSICAL THERAPY:   Treatment Note 2025       Episode  (R knee pain)               Treatment Diagnosis:    Chronic pain of right knee  Stiffness of right knee, not elsewhere classified  Difficulty in walking, not elsewhere classified  Medical/Referring Diagnosis:    Pain in right knee  Other chronic pain  Unilateral primary osteoarthritis, right knee      Referring Physician:  Ninoska Ray PA MD Orders:  PT Eval and Treat   Return MD Appt:     Date of Onset:     Allergies:   Latex, Acetaminophen, Aspirin, Azithromycin, Cephalexin, Chocolate flavoring agent (non-screening), Ibuprofen, Methadone, Mirabegron, Penicillins, Pentazocine, Propoxyphene, and Sulfa antibiotics  Restrictions/Precautions:   None      Interventions Planned (Treatment may consist of any combination of the following):  Current Treatment Recommendations: Strengthening; ROM; Manual; Home exercise program    See Assessment Note    Subjective Comments:   Not hurting today  Initial Pain Level:   0   /10  Post Session Pain Level:    0   /10  Medications Last Reviewed: 2025  Updated Objective Findings:  None Today  Treatment   THERAPEUTIC EXERCISE: (45 minutes):    Exercises per grid below to improve mobility and strength.  Required minimal visual and verbal cues to promote proper body alignment and promote proper body

## 2025-06-04 ENCOUNTER — HOSPITAL ENCOUNTER (OUTPATIENT)
Dept: PHYSICAL THERAPY | Age: 75
Setting detail: RECURRING SERIES
Discharge: HOME | End: 2025-06-07
Payer: COMMERCIAL

## 2025-06-04 PROCEDURE — 97110 THERAPEUTIC EXERCISES: CPT

## 2025-06-04 NOTE — PROGRESS NOTES
Nunu Recinos  : 1950  Primary: [employer] (Medicare Managed)  Secondary:  [address] [address]  Phone: [phone]  Fax: [phone] Plan Frequency: 2/week x 8 weeks    Plan of Care/Certification Expiration Date: 25        Plan of Care/Certification Expiration Date:  Plan of Care/Certification Expiration Date: 25    Frequency/Duration: Plan Frequency: 2/week x 8 weeks      Time In/Out:   Time In: 1245  Time Out: 0130      PT Visit Info:    Total # of Visits to Date: 8      Visit Count:  9    OUTPATIENT PHYSICAL THERAPY:   Treatment Note 2025       Episode  (R knee pain)               Treatment Diagnosis:    Chronic pain of right knee  Stiffness of right knee, not elsewhere classified  Difficulty in walking, not elsewhere classified  Medical/Referring Diagnosis:    Pain in right knee  Other chronic pain  Unilateral primary osteoarthritis, right knee      Referring Physician:  Ninoska Ray PA MD Orders:  PT Eval and Treat   Return MD Appt:     Date of Onset:     Allergies:   Latex, Acetaminophen, Aspirin, Azithromycin, Cephalexin, Chocolate flavoring agent (non-screening), Ibuprofen, Methadone, Mirabegron, Penicillins, Pentazocine, Propoxyphene, and Sulfa antibiotics  Restrictions/Precautions:   None      Interventions Planned (Treatment may consist of any combination of the following):  Current Treatment Recommendations: Strengthening; ROM; Manual; Home exercise program    See Assessment Note    Subjective Comments:   Not hurting today  Initial Pain Level:   0   /10  Post Session Pain Level:    0   /10  Medications Last Reviewed: 2025  Updated Objective Findings:  None Today  Treatment   THERAPEUTIC EXERCISE: (45 minutes):    Exercises per grid below to improve mobility and strength.  Required minimal visual and verbal cues to promote proper body alignment and promote proper body

## 2025-06-04 NOTE — THERAPY DISCHARGE
Nunu Recinos  : 1950  Primary: [employer] (Medicare Managed)  Secondary:  [address] [address]  Phone: [phone]  Fax: [phone] Plan Frequency: 2/week x 8 weeks    Plan of Care/Certification Expiration Date: 25        Plan of Care/Certification Expiration Date:  Plan of Care/Certification Expiration Date: 25    Frequency/Duration: Plan Frequency: 2/week x 8 weeks      Time In/Out:   Time In: 1245  Time Out: 0130      PT Visit Info:    Total # of Visits to Date: 9      Visit Count:  9                OUTPATIENT PHYSICAL THERAPY:             Discharge Summary 2025               Episode (R knee pain)         Treatment Diagnosis:     No data found  Medical/Referring Diagnosis:    Pain in right knee  Other chronic pain  Unilateral primary osteoarthritis, right knee      Referring Physician:  Ninoska Ray PA MD Orders:  PT Eval and Treat   Return MD Appt:    Date of Onset:      Allergies:  Latex, Acetaminophen, Aspirin, Azithromycin, Cephalexin, Chocolate flavoring agent (non-screening), Ibuprofen, Methadone, Mirabegron, Penicillins, Pentazocine, Propoxyphene, and Sulfa antibiotics  Restrictions/Precautions:    None      Medications Last Reviewed: 2025     SUBJECTIVE   Pt has participated in 9 sessions of PT with good resolution of R knee pain.     OBJECTIVE   ROM-  L knee 0-135   R knee 5-105  Observation- min swelling mostly in the posterior  Strength- L ant tib-4-/5  Mobility- sit to stand - able to do with cues without UE.  Usually does it quickly and pushes off of seat    Outcome Measure:   Tool Used: Lower Extremity Functional Scale (LEFS)  Score:  Initial: 64/80 Most Recent: 80 (Date: -- )   Interpretation of Score: 20 questions each scored on a 5 point scale with 0 representing \"extreme difficulty or unable to perform\" and 4 representing \"no difficulty\".  The lower

## 2025-07-15 ENCOUNTER — TRANSCRIBE ORDERS (OUTPATIENT)
Dept: SCHEDULING | Age: 75
End: 2025-07-15

## 2025-07-15 DIAGNOSIS — Z12.31 OTHER SCREENING MAMMOGRAM: Primary | ICD-10-CM

## 2025-07-28 ENCOUNTER — OFFICE VISIT (OUTPATIENT)
Dept: ENT CLINIC | Age: 75
End: 2025-07-28
Payer: COMMERCIAL

## 2025-07-28 VITALS — BODY MASS INDEX: 30.27 KG/M2 | RESPIRATION RATE: 12 BRPM | HEIGHT: 63 IN | WEIGHT: 170.86 LBS

## 2025-07-28 DIAGNOSIS — H61.23 BILATERAL IMPACTED CERUMEN: Primary | ICD-10-CM

## 2025-07-28 PROCEDURE — 69210 REMOVE IMPACTED EAR WAX UNI: CPT | Performed by: OTOLARYNGOLOGY

## 2025-07-28 ASSESSMENT — ENCOUNTER SYMPTOMS
ALLERGIC/IMMUNOLOGIC NEGATIVE: 1
EYES NEGATIVE: 1
GASTROINTESTINAL NEGATIVE: 1
RESPIRATORY NEGATIVE: 1

## 2025-07-28 NOTE — PROGRESS NOTES
Chief Complaint   Patient presents with    Follow-up     6 mos ear cleaning        HPI:  Nunu Recinos is a 74 y.o. female seen in follow-up for her ears. She has a long history of cerumen impaction and has very narrowed EACs bilaterally and requires ear cleanings every 6 months. Last seen back on 1/27/25.  Doing well since then, but both ears do feel stopped up.  She denies any significant change in hearing lately.  There has not been any otalgia, otorrhea, or ear pressure/fullness.  No other new ENT complaints.    Past Medical History, Past Surgical History, Family history, Social History, and Medications were all reviewed with the patient today and updated as necessary.     Allergies   Allergen Reactions    Latex     Acetaminophen     Aspirin      Other reaction(s): Blood count problem-Allergy    Azithromycin     Cephalexin     Chocolate Flavoring Agent (Non-Screening)     Ibuprofen     Methadone     Mirabegron Other (See Comments)    Penicillins     Pentazocine     Propoxyphene Nausea Only and Rash    Sulfa Antibiotics Rash     Patient Active Problem List   Diagnosis    Vitamin D deficiency    Hypothyroidism     Current Outpatient Medications   Medication Sig    pravastatin (PRAVACHOL) 20 MG tablet     ASPIRIN 81 PO Take by mouth    Biotin 10 MG tablet Take by mouth    calcium citrate-vitamin D (CITRICAL + D) 315-250 MG-UNIT TABS per tablet Take by mouth 2 times daily    Cholecalciferol 50 MCG (2000 UT) TABS Take by mouth    docusate (COLACE, DULCOLAX) 100 MG CAPS Take 100 mg by mouth    ferrous gluconate (FERGON) 240 (27 Fe) MG tablet Take 1 tablet by mouth 3 times daily (with meals)    levothyroxine (SYNTHROID) 50 MCG tablet TAKE ONE TABLET BY MOUTH EVERY DAY     No current facility-administered medications for this visit.     Past Medical History:   Diagnosis Date    Anemia     IBS (irritable bowel syndrome)     Mixed conductive and sensorineural hearing loss     Unspecified hypothyroidism 2/26/2013